# Patient Record
Sex: FEMALE | Race: WHITE | NOT HISPANIC OR LATINO | URBAN - METROPOLITAN AREA
[De-identification: names, ages, dates, MRNs, and addresses within clinical notes are randomized per-mention and may not be internally consistent; named-entity substitution may affect disease eponyms.]

---

## 2017-09-21 ENCOUNTER — CONVERSION ENCOUNTER (OUTPATIENT)
Dept: GENERAL RADIOLOGY | Facility: HOSPITAL | Age: 72
End: 2017-09-21

## 2018-01-09 ENCOUNTER — OFFICE VISIT CONVERTED (OUTPATIENT)
Dept: FAMILY MEDICINE CLINIC | Facility: CLINIC | Age: 73
End: 2018-01-09
Attending: NURSE PRACTITIONER

## 2018-02-20 ENCOUNTER — CONVERSION ENCOUNTER (OUTPATIENT)
Dept: FAMILY MEDICINE CLINIC | Facility: CLINIC | Age: 73
End: 2018-02-20

## 2018-02-20 ENCOUNTER — OFFICE VISIT CONVERTED (OUTPATIENT)
Dept: FAMILY MEDICINE CLINIC | Facility: CLINIC | Age: 73
End: 2018-02-20
Attending: NURSE PRACTITIONER

## 2018-03-19 ENCOUNTER — CONVERSION ENCOUNTER (OUTPATIENT)
Dept: FAMILY MEDICINE CLINIC | Facility: CLINIC | Age: 73
End: 2018-03-19

## 2018-03-19 ENCOUNTER — OFFICE VISIT CONVERTED (OUTPATIENT)
Dept: FAMILY MEDICINE CLINIC | Facility: CLINIC | Age: 73
End: 2018-03-19
Attending: NURSE PRACTITIONER

## 2018-03-28 ENCOUNTER — OFFICE VISIT CONVERTED (OUTPATIENT)
Dept: GASTROENTEROLOGY | Facility: CLINIC | Age: 73
End: 2018-03-28
Attending: NURSE PRACTITIONER

## 2018-05-09 ENCOUNTER — OFFICE VISIT CONVERTED (OUTPATIENT)
Dept: FAMILY MEDICINE CLINIC | Facility: CLINIC | Age: 73
End: 2018-05-09
Attending: NURSE PRACTITIONER

## 2018-07-31 ENCOUNTER — OFFICE VISIT CONVERTED (OUTPATIENT)
Dept: GASTROENTEROLOGY | Facility: CLINIC | Age: 73
End: 2018-07-31
Attending: NURSE PRACTITIONER

## 2018-09-24 ENCOUNTER — CONVERSION ENCOUNTER (OUTPATIENT)
Dept: GENERAL RADIOLOGY | Facility: HOSPITAL | Age: 73
End: 2018-09-24

## 2018-10-03 ENCOUNTER — OFFICE VISIT CONVERTED (OUTPATIENT)
Dept: FAMILY MEDICINE CLINIC | Facility: CLINIC | Age: 73
End: 2018-10-03
Attending: NURSE PRACTITIONER

## 2018-10-03 ENCOUNTER — CONVERSION ENCOUNTER (OUTPATIENT)
Dept: FAMILY MEDICINE CLINIC | Facility: CLINIC | Age: 73
End: 2018-10-03

## 2018-10-15 ENCOUNTER — CONVERSION ENCOUNTER (OUTPATIENT)
Dept: GASTROENTEROLOGY | Facility: CLINIC | Age: 73
End: 2018-10-15

## 2018-10-15 ENCOUNTER — OFFICE VISIT CONVERTED (OUTPATIENT)
Dept: GASTROENTEROLOGY | Facility: CLINIC | Age: 73
End: 2018-10-15
Attending: NURSE PRACTITIONER

## 2018-10-24 ENCOUNTER — CONVERSION ENCOUNTER (OUTPATIENT)
Dept: FAMILY MEDICINE CLINIC | Facility: CLINIC | Age: 73
End: 2018-10-24

## 2018-10-24 ENCOUNTER — CONVERSION ENCOUNTER (OUTPATIENT)
Dept: GENERAL RADIOLOGY | Facility: HOSPITAL | Age: 73
End: 2018-10-24

## 2018-10-24 ENCOUNTER — OFFICE VISIT CONVERTED (OUTPATIENT)
Dept: FAMILY MEDICINE CLINIC | Facility: CLINIC | Age: 73
End: 2018-10-24
Attending: NURSE PRACTITIONER

## 2018-11-14 ENCOUNTER — OFFICE VISIT CONVERTED (OUTPATIENT)
Dept: FAMILY MEDICINE CLINIC | Facility: CLINIC | Age: 73
End: 2018-11-14
Attending: NURSE PRACTITIONER

## 2018-11-14 ENCOUNTER — CONVERSION ENCOUNTER (OUTPATIENT)
Dept: FAMILY MEDICINE CLINIC | Facility: CLINIC | Age: 73
End: 2018-11-14

## 2019-01-29 ENCOUNTER — HOSPITAL ENCOUNTER (OUTPATIENT)
Dept: GENERAL RADIOLOGY | Facility: HOSPITAL | Age: 74
Discharge: HOME | End: 2019-01-29
Attending: INTERNAL MEDICINE

## 2019-02-18 ENCOUNTER — HOSPITAL ENCOUNTER (OUTPATIENT)
Dept: LAB | Facility: HOSPITAL | Age: 74
Discharge: HOME | End: 2019-02-18

## 2019-02-18 LAB
ALBUMIN SERPL-MCNC: 3.3 G/DL (ref 3.5–5)
ALBUMIN/GLOB SERPL: 1.1 {RATIO} (ref 1.4–2.6)
ALP SERPL-CCNC: 96 U/L (ref 43–160)
ALT SERPL-CCNC: 16 U/L (ref 10–40)
ANION GAP SERPL CALC-SCNC: 16 MMOL/L (ref 8–19)
APPEARANCE UR: CLEAR
AST SERPL-CCNC: 12 U/L (ref 15–50)
BILIRUB SERPL-MCNC: 0.4 MG/DL (ref 0.2–1.3)
BILIRUB UR QL: NEGATIVE
BUN SERPL-MCNC: 45 MG/DL (ref 5–25)
BUN/CREAT SERPL: 15 {RATIO} (ref 6–20)
CALCIUM SERPL-MCNC: 9.8 MG/DL (ref 8.7–10.4)
CHLORIDE SERPL-SCNC: 107 MMOL/L (ref 99–111)
COLOR UR: YELLOW
CONV BACTERIA: NEGATIVE
CONV CO2: 26 MMOL/L (ref 22–32)
CONV COLLECTION SOURCE (UA): ABNORMAL
CONV CREATININE URINE, RANDOM: 81.2 MG/DL (ref 10–300)
CONV MICROALBUM.,U,RANDOM: 372.7 MG/L (ref 0–20)
CONV PROTEIN TO CREATININE RATIO (RANDOM URINE): 0.82 {RATIO} (ref 0–0.1)
CONV TOTAL PROTEIN: 6.2 G/DL (ref 6.3–8.2)
CONV UROBILINOGEN IN URINE BY AUTOMATED TEST STRIP: 1 {EHRLICHU}/DL (ref 0.1–1)
CREAT UR-MCNC: 2.95 MG/DL (ref 0.5–0.9)
GFR SERPLBLD BASED ON 1.73 SQ M-ARVRAT: 15 ML/MIN/{1.73_M2}
GLOBULIN UR ELPH-MCNC: 2.9 G/DL (ref 2–3.5)
GLUCOSE SERPL-MCNC: 93 MG/DL (ref 65–99)
GLUCOSE UR QL: NEGATIVE MG/DL
HGB UR QL STRIP: NEGATIVE
KETONES UR QL STRIP: NEGATIVE MG/DL
LEUKOCYTE ESTERASE UR QL STRIP: ABNORMAL
MAGNESIUM SERPL-MCNC: 2.13 MG/DL (ref 1.6–2.3)
MICROALBUMIN/CREAT UR: 459 MG/G{CRE} (ref 0–35)
NITRITE UR QL STRIP: NEGATIVE
OSMOLALITY SERPL CALC.SUM OF ELEC: 311 MOSM/KG (ref 273–304)
PH UR STRIP.AUTO: 7 [PH] (ref 5–8)
POTASSIUM SERPL-SCNC: 3.8 MMOL/L (ref 3.5–5.3)
PROT UR QL: 100 MG/DL
PROT UR-MCNC: 66.4 MG/DL
RBC #/AREA URNS HPF: ABNORMAL /[HPF]
SODIUM SERPL-SCNC: 145 MMOL/L (ref 135–147)
SP GR UR: 1.02 (ref 1–1.03)
WBC #/AREA URNS HPF: ABNORMAL /[HPF]

## 2019-04-01 ENCOUNTER — HOSPITAL ENCOUNTER (OUTPATIENT)
Dept: LAB | Facility: HOSPITAL | Age: 74
Discharge: HOME | End: 2019-04-01
Attending: NURSE PRACTITIONER

## 2019-04-01 LAB
APPEARANCE UR: CLEAR
BILIRUB UR QL: NEGATIVE
COLOR UR: YELLOW
CONV BACTERIA: NEGATIVE
CONV COLLECTION SOURCE (UA): ABNORMAL
CONV UROBILINOGEN IN URINE BY AUTOMATED TEST STRIP: 1 {EHRLICHU}/DL (ref 0.1–1)
CONV YEAST, UA: PRESENT
GLUCOSE UR QL: NEGATIVE MG/DL
HGB UR QL STRIP: NEGATIVE
KETONES UR QL STRIP: NEGATIVE MG/DL
LEUKOCYTE ESTERASE UR QL STRIP: ABNORMAL
NITRITE UR QL STRIP: NEGATIVE
PH UR STRIP.AUTO: 5.5 [PH] (ref 5–8)
PROT UR QL: 30 MG/DL
RBC #/AREA URNS HPF: ABNORMAL /[HPF]
SP GR UR: 1.02 (ref 1–1.03)
SQUAMOUS SPT QL MICRO: ABNORMAL /[HPF]
WBC #/AREA URNS HPF: ABNORMAL /[HPF]

## 2019-05-10 ENCOUNTER — HOSPITAL ENCOUNTER (OUTPATIENT)
Dept: OTHER | Facility: HOSPITAL | Age: 74
Discharge: HOME | End: 2019-05-10
Attending: INTERNAL MEDICINE

## 2019-05-10 LAB
ANION GAP SERPL CALC-SCNC: 24 MMOL/L (ref 8–19)
BUN SERPL-MCNC: 59 MG/DL (ref 5–25)
BUN/CREAT SERPL: 15 {RATIO} (ref 6–20)
CALCIUM SERPL-MCNC: 9.6 MG/DL (ref 8.7–10.4)
CHLORIDE SERPL-SCNC: 99 MMOL/L (ref 99–111)
CONV CO2: 23 MMOL/L (ref 22–32)
CREAT UR-MCNC: 3.98 MG/DL (ref 0.5–0.9)
GFR SERPLBLD BASED ON 1.73 SQ M-ARVRAT: 10 ML/MIN/{1.73_M2}
GLUCOSE SERPL-MCNC: 137 MG/DL (ref 65–99)
OSMOLALITY SERPL CALC.SUM OF ELEC: 313 MOSM/KG (ref 273–304)
POTASSIUM SERPL-SCNC: 3.7 MMOL/L (ref 3.5–5.3)
SODIUM SERPL-SCNC: 142 MMOL/L (ref 135–147)

## 2019-05-17 ENCOUNTER — HOSPITAL ENCOUNTER (OUTPATIENT)
Dept: LAB | Facility: HOSPITAL | Age: 74
Discharge: HOME | End: 2019-05-17
Attending: INTERNAL MEDICINE

## 2019-05-17 LAB
ANION GAP SERPL CALC-SCNC: 19 MMOL/L (ref 8–19)
BUN SERPL-MCNC: 41 MG/DL (ref 5–25)
BUN/CREAT SERPL: 11 {RATIO} (ref 6–20)
CALCIUM SERPL-MCNC: 9.2 MG/DL (ref 8.7–10.4)
CHLORIDE SERPL-SCNC: 105 MMOL/L (ref 99–111)
CONV CO2: 23 MMOL/L (ref 22–32)
CREAT UR-MCNC: 3.7 MG/DL (ref 0.5–0.9)
GFR SERPLBLD BASED ON 1.73 SQ M-ARVRAT: 11 ML/MIN/{1.73_M2}
GLUCOSE SERPL-MCNC: 114 MG/DL (ref 65–99)
OSMOLALITY SERPL CALC.SUM OF ELEC: 307 MOSM/KG (ref 273–304)
POTASSIUM SERPL-SCNC: 3.9 MMOL/L (ref 3.5–5.3)
SODIUM SERPL-SCNC: 143 MMOL/L (ref 135–147)

## 2019-05-21 ENCOUNTER — OFFICE VISIT CONVERTED (OUTPATIENT)
Dept: FAMILY MEDICINE CLINIC | Facility: CLINIC | Age: 74
End: 2019-05-21
Attending: NURSE PRACTITIONER

## 2019-05-21 ENCOUNTER — CONVERSION ENCOUNTER (OUTPATIENT)
Dept: FAMILY MEDICINE CLINIC | Facility: CLINIC | Age: 74
End: 2019-05-21

## 2019-06-03 ENCOUNTER — HOSPITAL ENCOUNTER (OUTPATIENT)
Dept: LAB | Facility: HOSPITAL | Age: 74
Discharge: HOME | End: 2019-06-03
Attending: INTERNAL MEDICINE

## 2019-06-03 LAB
ANION GAP SERPL CALC-SCNC: 20 MMOL/L (ref 8–19)
BUN SERPL-MCNC: 51 MG/DL (ref 5–25)
BUN/CREAT SERPL: 15 {RATIO} (ref 6–20)
CALCIUM SERPL-MCNC: 9.1 MG/DL (ref 8.7–10.4)
CHLORIDE SERPL-SCNC: 104 MMOL/L (ref 99–111)
CONV CO2: 23 MMOL/L (ref 22–32)
CREAT UR-MCNC: 3.37 MG/DL (ref 0.5–0.9)
GFR SERPLBLD BASED ON 1.73 SQ M-ARVRAT: 13 ML/MIN/{1.73_M2}
GLUCOSE SERPL-MCNC: 114 MG/DL (ref 65–99)
OSMOLALITY SERPL CALC.SUM OF ELEC: 311 MOSM/KG (ref 273–304)
POTASSIUM SERPL-SCNC: 3.6 MMOL/L (ref 3.5–5.3)
SODIUM SERPL-SCNC: 143 MMOL/L (ref 135–147)

## 2019-06-18 ENCOUNTER — HOSPITAL ENCOUNTER (OUTPATIENT)
Dept: LAB | Facility: HOSPITAL | Age: 74
Discharge: HOME | End: 2019-06-18

## 2019-06-18 LAB
ANION GAP SERPL CALC-SCNC: 22 MMOL/L (ref 8–19)
BUN SERPL-MCNC: 53 MG/DL (ref 5–25)
BUN/CREAT SERPL: 14 {RATIO} (ref 6–20)
CALCIUM SERPL-MCNC: 9.4 MG/DL (ref 8.7–10.4)
CHLORIDE SERPL-SCNC: 106 MMOL/L (ref 99–111)
CONV CO2: 22 MMOL/L (ref 22–32)
CREAT UR-MCNC: 3.75 MG/DL (ref 0.5–0.9)
GFR SERPLBLD BASED ON 1.73 SQ M-ARVRAT: 11 ML/MIN/{1.73_M2}
GLUCOSE SERPL-MCNC: 119 MG/DL (ref 65–99)
MAGNESIUM SERPL-MCNC: 1.98 MG/DL (ref 1.6–2.3)
OSMOLALITY SERPL CALC.SUM OF ELEC: 318 MOSM/KG (ref 273–304)
POTASSIUM SERPL-SCNC: 4.4 MMOL/L (ref 3.5–5.3)
SODIUM SERPL-SCNC: 146 MMOL/L (ref 135–147)

## 2019-07-01 ENCOUNTER — HOSPITAL ENCOUNTER (OUTPATIENT)
Dept: GENERAL RADIOLOGY | Facility: HOSPITAL | Age: 74
Discharge: HOME | End: 2019-07-01
Attending: NURSE PRACTITIONER

## 2019-07-01 ENCOUNTER — OFFICE VISIT CONVERTED (OUTPATIENT)
Dept: FAMILY MEDICINE CLINIC | Facility: CLINIC | Age: 74
End: 2019-07-01
Attending: NURSE PRACTITIONER

## 2019-07-01 ENCOUNTER — CONVERSION ENCOUNTER (OUTPATIENT)
Dept: FAMILY MEDICINE CLINIC | Facility: CLINIC | Age: 74
End: 2019-07-01

## 2019-07-01 ENCOUNTER — HOSPITAL ENCOUNTER (OUTPATIENT)
Dept: LAB | Facility: HOSPITAL | Age: 74
Discharge: HOME | End: 2019-07-01
Attending: INTERNAL MEDICINE

## 2019-07-01 LAB
ANION GAP SERPL CALC-SCNC: 22 MMOL/L (ref 8–19)
BUN SERPL-MCNC: 36 MG/DL (ref 5–25)
BUN/CREAT SERPL: 8 {RATIO} (ref 6–20)
CALCIUM SERPL-MCNC: 9.5 MG/DL (ref 8.7–10.4)
CHLORIDE SERPL-SCNC: 103 MMOL/L (ref 99–111)
CONV CO2: 22 MMOL/L (ref 22–32)
CREAT UR-MCNC: 4.61 MG/DL (ref 0.5–0.9)
GFR SERPLBLD BASED ON 1.73 SQ M-ARVRAT: 9 ML/MIN/{1.73_M2}
GLUCOSE SERPL-MCNC: 108 MG/DL (ref 65–99)
OSMOLALITY SERPL CALC.SUM OF ELEC: 303 MOSM/KG (ref 273–304)
POTASSIUM SERPL-SCNC: 4.5 MMOL/L (ref 3.5–5.3)
SODIUM SERPL-SCNC: 142 MMOL/L (ref 135–147)

## 2019-07-03 ENCOUNTER — HOSPITAL ENCOUNTER (OUTPATIENT)
Dept: LAB | Facility: HOSPITAL | Age: 74
Discharge: HOME | End: 2019-07-03
Attending: NURSE PRACTITIONER

## 2019-07-03 ENCOUNTER — CONVERSION ENCOUNTER (OUTPATIENT)
Dept: GASTROENTEROLOGY | Facility: CLINIC | Age: 74
End: 2019-07-03

## 2019-07-03 ENCOUNTER — OFFICE VISIT CONVERTED (OUTPATIENT)
Dept: GASTROENTEROLOGY | Facility: CLINIC | Age: 74
End: 2019-07-03
Attending: NURSE PRACTITIONER

## 2019-07-03 LAB — BNP SERPL-MCNC: 7079 PG/ML (ref 0–1800)

## 2019-07-12 ENCOUNTER — HOSPITAL ENCOUNTER (OUTPATIENT)
Dept: LAB | Facility: HOSPITAL | Age: 74
Discharge: HOME | End: 2019-07-12
Attending: INTERNAL MEDICINE

## 2019-07-12 LAB
ALBUMIN SERPL-MCNC: 3.7 G/DL (ref 3.5–5)
ALBUMIN/GLOB SERPL: 1.2 {RATIO} (ref 1.4–2.6)
ALP SERPL-CCNC: 117 U/L (ref 43–160)
ALT SERPL-CCNC: 6 U/L (ref 10–40)
ANION GAP SERPL CALC-SCNC: 21 MMOL/L (ref 8–19)
AST SERPL-CCNC: 12 U/L (ref 15–50)
BASOPHILS # BLD AUTO: 0.1 10*3/UL (ref 0–0.2)
BASOPHILS NFR BLD AUTO: 1.2 % (ref 0–3)
BILIRUB SERPL-MCNC: 0.36 MG/DL (ref 0.2–1.3)
BUN SERPL-MCNC: 36 MG/DL (ref 5–25)
BUN/CREAT SERPL: 8 {RATIO} (ref 6–20)
CALCIUM SERPL-MCNC: 9.6 MG/DL (ref 8.7–10.4)
CHLORIDE SERPL-SCNC: 105 MMOL/L (ref 99–111)
CONV ABS IMM GRAN: 0.05 10*3/UL (ref 0–0.2)
CONV CO2: 21 MMOL/L (ref 22–32)
CONV IMMATURE GRAN: 0.6 % (ref 0–1.8)
CONV TOTAL PROTEIN: 6.7 G/DL (ref 6.3–8.2)
CREAT UR-MCNC: 4.65 MG/DL (ref 0.5–0.9)
DEPRECATED RDW RBC AUTO: 55.9 FL (ref 36.4–46.3)
EOSINOPHIL # BLD AUTO: 0.4 10*3/UL (ref 0–0.7)
EOSINOPHIL # BLD AUTO: 5 % (ref 0–7)
ERYTHROCYTE [DISTWIDTH] IN BLOOD BY AUTOMATED COUNT: 16.5 % (ref 11.7–14.4)
GFR SERPLBLD BASED ON 1.73 SQ M-ARVRAT: 9 ML/MIN/{1.73_M2}
GLOBULIN UR ELPH-MCNC: 3 G/DL (ref 2–3.5)
GLUCOSE SERPL-MCNC: 112 MG/DL (ref 65–99)
HBA1C MFR BLD: 10.2 G/DL (ref 12–16)
HCT VFR BLD AUTO: 33.6 % (ref 37–47)
LIPASE SERPL-CCNC: 28 U/L (ref 5–51)
LYMPHOCYTES # BLD AUTO: 1.68 10*3/UL (ref 1–5)
MCH RBC QN AUTO: 28 PG (ref 27–31)
MCHC RBC AUTO-ENTMCNC: 30.4 G/DL (ref 33–37)
MCV RBC AUTO: 92.3 FL (ref 81–99)
MONOCYTES # BLD AUTO: 0.92 10*3/UL (ref 0.2–1.2)
MONOCYTES NFR BLD AUTO: 11.4 % (ref 3–10)
NEUTROPHILS # BLD AUTO: 4.92 10*3/UL (ref 2–8)
NEUTROPHILS NFR BLD AUTO: 61 % (ref 30–85)
NRBC CBCN: 0 % (ref 0–0.7)
OSMOLALITY SERPL CALC.SUM OF ELEC: 305 MOSM/KG (ref 273–304)
PLATELET # BLD AUTO: 261 10*3/UL (ref 130–400)
PMV BLD AUTO: 9.8 FL (ref 9.4–12.3)
POTASSIUM SERPL-SCNC: 4.1 MMOL/L (ref 3.5–5.3)
RBC # BLD AUTO: 3.64 10*6/UL (ref 4.2–5.4)
SODIUM SERPL-SCNC: 143 MMOL/L (ref 135–147)
VARIANT LYMPHS NFR BLD MANUAL: 20.8 % (ref 20–45)
WBC # BLD AUTO: 8.07 10*3/UL (ref 4.8–10.8)

## 2019-07-16 ENCOUNTER — HOSPITAL ENCOUNTER (OUTPATIENT)
Dept: GENERAL RADIOLOGY | Facility: HOSPITAL | Age: 74
Discharge: HOME | End: 2019-07-16
Attending: INTERNAL MEDICINE

## 2019-07-18 ENCOUNTER — HOSPITAL ENCOUNTER (OUTPATIENT)
Dept: LAB | Facility: HOSPITAL | Age: 74
Discharge: HOME | End: 2019-07-18
Attending: INTERNAL MEDICINE

## 2019-07-18 LAB
ANION GAP SERPL CALC-SCNC: 21 MMOL/L (ref 8–19)
BUN SERPL-MCNC: 38 MG/DL (ref 5–25)
BUN/CREAT SERPL: 8 {RATIO} (ref 6–20)
CALCIUM SERPL-MCNC: 9.7 MG/DL (ref 8.7–10.4)
CHLORIDE SERPL-SCNC: 107 MMOL/L (ref 99–111)
CONV CO2: 21 MMOL/L (ref 22–32)
CREAT UR-MCNC: 4.49 MG/DL (ref 0.5–0.9)
GFR SERPLBLD BASED ON 1.73 SQ M-ARVRAT: 9 ML/MIN/{1.73_M2}
GLUCOSE SERPL-MCNC: 106 MG/DL (ref 65–99)
OSMOLALITY SERPL CALC.SUM OF ELEC: 307 MOSM/KG (ref 273–304)
POTASSIUM SERPL-SCNC: 4.6 MMOL/L (ref 3.5–5.3)
SODIUM SERPL-SCNC: 144 MMOL/L (ref 135–147)

## 2019-07-29 ENCOUNTER — HOSPITAL ENCOUNTER (OUTPATIENT)
Dept: LAB | Facility: HOSPITAL | Age: 74
Discharge: HOME | End: 2019-07-29
Attending: INTERNAL MEDICINE

## 2019-07-29 LAB
ALBUMIN SERPL-MCNC: 3.8 G/DL (ref 3.5–5)
ALBUMIN/GLOB SERPL: 1.3 {RATIO} (ref 1.4–2.6)
ALP SERPL-CCNC: 117 U/L (ref 43–160)
ALT SERPL-CCNC: 9 U/L (ref 10–40)
ANION GAP SERPL CALC-SCNC: 22 MMOL/L (ref 8–19)
AST SERPL-CCNC: 17 U/L (ref 15–50)
BILIRUB SERPL-MCNC: 0.43 MG/DL (ref 0.2–1.3)
BUN SERPL-MCNC: 29 MG/DL (ref 5–25)
BUN/CREAT SERPL: 7 {RATIO} (ref 6–20)
CALCIUM SERPL-MCNC: 10.2 MG/DL (ref 8.7–10.4)
CHLORIDE SERPL-SCNC: 101 MMOL/L (ref 99–111)
CONV CO2: 22 MMOL/L (ref 22–32)
CONV TOTAL PROTEIN: 6.8 G/DL (ref 6.3–8.2)
CREAT UR-MCNC: 4.27 MG/DL (ref 0.5–0.9)
GFR SERPLBLD BASED ON 1.73 SQ M-ARVRAT: 9 ML/MIN/{1.73_M2}
GLOBULIN UR ELPH-MCNC: 3 G/DL (ref 2–3.5)
GLUCOSE SERPL-MCNC: 93 MG/DL (ref 65–99)
OSMOLALITY SERPL CALC.SUM OF ELEC: 298 MOSM/KG (ref 273–304)
POTASSIUM SERPL-SCNC: 4 MMOL/L (ref 3.5–5.3)
SODIUM SERPL-SCNC: 141 MMOL/L (ref 135–147)

## 2019-07-31 ENCOUNTER — HOSPITAL ENCOUNTER (OUTPATIENT)
Dept: LAB | Facility: HOSPITAL | Age: 74
Discharge: HOME | End: 2019-07-31
Attending: INTERNAL MEDICINE

## 2019-07-31 LAB
ALT SERPL-CCNC: 8 U/L (ref 10–40)
ANION GAP SERPL CALC-SCNC: 24 MMOL/L (ref 8–19)
BUN SERPL-MCNC: 37 MG/DL (ref 5–25)
BUN/CREAT SERPL: 8 {RATIO} (ref 6–20)
CALCIUM SERPL-MCNC: 10.2 MG/DL (ref 8.7–10.4)
CHLORIDE SERPL-SCNC: 102 MMOL/L (ref 99–111)
CHOLEST SERPL-MCNC: 280 MG/DL (ref 107–200)
CHOLEST/HDLC SERPL: 7.4 {RATIO} (ref 3–6)
CONV CO2: 20 MMOL/L (ref 22–32)
CONV CREATININE URINE, RANDOM: 60 MG/DL (ref 10–300)
CONV MICROALBUM.,U,RANDOM: 181.6 MG/L (ref 0–20)
CONV PROTEIN TO CREATININE RATIO (RANDOM URINE): 0.61 {RATIO} (ref 0–0.1)
CREAT UR-MCNC: 4.8 MG/DL (ref 0.5–0.9)
CRP SERPL HS-MCNC: 1.26 MG/DL (ref 0–0.5)
GFR SERPLBLD BASED ON 1.73 SQ M-ARVRAT: 8 ML/MIN/{1.73_M2}
GLUCOSE SERPL-MCNC: 87 MG/DL (ref 65–99)
HDLC SERPL-MCNC: 38 MG/DL (ref 40–60)
LDLC SERPL CALC-MCNC: 173 MG/DL (ref 70–100)
MICROALBUMIN/CREAT UR: 302.7 MG/G{CRE} (ref 0–35)
OSMOLALITY SERPL CALC.SUM OF ELEC: 300 MOSM/KG (ref 273–304)
POTASSIUM SERPL-SCNC: 5 MMOL/L (ref 3.5–5.3)
PROT UR-MCNC: 36.3 MG/DL
SODIUM SERPL-SCNC: 141 MMOL/L (ref 135–147)
TRIGL SERPL-MCNC: 347 MG/DL (ref 40–150)
VLDLC SERPL-MCNC: 69 MG/DL (ref 5–37)

## 2019-08-20 ENCOUNTER — HOSPITAL ENCOUNTER (OUTPATIENT)
Dept: LAB | Facility: HOSPITAL | Age: 74
Discharge: HOME | End: 2019-08-20
Attending: NURSE PRACTITIONER

## 2019-08-20 LAB — BNP SERPL-MCNC: 7366 PG/ML (ref 0–1800)

## 2019-08-21 ENCOUNTER — OFFICE VISIT CONVERTED (OUTPATIENT)
Dept: FAMILY MEDICINE CLINIC | Facility: CLINIC | Age: 74
End: 2019-08-21
Attending: NURSE PRACTITIONER

## 2019-08-28 ENCOUNTER — OFFICE VISIT CONVERTED (OUTPATIENT)
Dept: PULMONOLOGY | Facility: CLINIC | Age: 74
End: 2019-08-28
Attending: PHYSICIAN ASSISTANT

## 2019-08-30 ENCOUNTER — HOSPITAL ENCOUNTER (OUTPATIENT)
Dept: CARDIAC REHAB | Facility: HOSPITAL | Age: 74
Setting detail: RECURRING SERIES
Discharge: HOME | End: 2019-11-22
Attending: FAMILY MEDICINE

## 2019-09-05 ENCOUNTER — HOSPITAL ENCOUNTER (OUTPATIENT)
Dept: LAB | Facility: HOSPITAL | Age: 74
Discharge: HOME | End: 2019-09-05
Attending: INTERNAL MEDICINE

## 2019-09-05 LAB
ANION GAP SERPL CALC-SCNC: 24 MMOL/L (ref 8–19)
BUN SERPL-MCNC: 53 MG/DL (ref 5–25)
BUN/CREAT SERPL: 11 {RATIO} (ref 6–20)
CALCIUM SERPL-MCNC: 10 MG/DL (ref 8.7–10.4)
CHLORIDE SERPL-SCNC: 102 MMOL/L (ref 99–111)
CONV CO2: 19 MMOL/L (ref 22–32)
CREAT UR-MCNC: 4.61 MG/DL (ref 0.5–0.9)
GFR SERPLBLD BASED ON 1.73 SQ M-ARVRAT: 9 ML/MIN/{1.73_M2}
GLUCOSE SERPL-MCNC: 130 MG/DL (ref 65–99)
OSMOLALITY SERPL CALC.SUM OF ELEC: 304 MOSM/KG (ref 273–304)
POTASSIUM SERPL-SCNC: 5.5 MMOL/L (ref 3.5–5.3)
SODIUM SERPL-SCNC: 139 MMOL/L (ref 135–147)

## 2019-10-01 ENCOUNTER — HOSPITAL ENCOUNTER (OUTPATIENT)
Dept: LAB | Facility: HOSPITAL | Age: 74
Discharge: HOME | End: 2019-10-01
Attending: INTERNAL MEDICINE

## 2019-10-01 LAB
ANION GAP SERPL CALC-SCNC: 23 MMOL/L (ref 8–19)
BUN SERPL-MCNC: 51 MG/DL (ref 5–25)
BUN/CREAT SERPL: 11 {RATIO} (ref 6–20)
CALCIUM SERPL-MCNC: 10.3 MG/DL (ref 8.7–10.4)
CHLORIDE SERPL-SCNC: 106 MMOL/L (ref 99–111)
CONV CO2: 19 MMOL/L (ref 22–32)
CREAT UR-MCNC: 4.8 MG/DL (ref 0.5–0.9)
GFR SERPLBLD BASED ON 1.73 SQ M-ARVRAT: 8 ML/MIN/{1.73_M2}
GLUCOSE SERPL-MCNC: 97 MG/DL (ref 65–99)
OSMOLALITY SERPL CALC.SUM OF ELEC: 308 MOSM/KG (ref 273–304)
POTASSIUM SERPL-SCNC: 5.5 MMOL/L (ref 3.5–5.3)
SODIUM SERPL-SCNC: 142 MMOL/L (ref 135–147)

## 2019-10-07 ENCOUNTER — HOSPITAL ENCOUNTER (OUTPATIENT)
Dept: LAB | Facility: HOSPITAL | Age: 74
Discharge: HOME | End: 2019-10-07
Attending: INTERNAL MEDICINE

## 2019-10-07 LAB
ALBUMIN SERPL-MCNC: 4.1 G/DL (ref 3.5–5)
ALBUMIN/GLOB SERPL: 1.2 {RATIO} (ref 1.4–2.6)
ALP SERPL-CCNC: 120 U/L (ref 43–160)
ALT SERPL-CCNC: 9 U/L (ref 10–40)
ANION GAP SERPL CALC-SCNC: 24 MMOL/L (ref 8–19)
AST SERPL-CCNC: 18 U/L (ref 15–50)
BILIRUB SERPL-MCNC: 0.38 MG/DL (ref 0.2–1.3)
BUN SERPL-MCNC: 57 MG/DL (ref 5–25)
BUN/CREAT SERPL: 11 {RATIO} (ref 6–20)
CALCIUM SERPL-MCNC: 10.3 MG/DL (ref 8.7–10.4)
CHLORIDE SERPL-SCNC: 101 MMOL/L (ref 99–111)
CONV CO2: 19 MMOL/L (ref 22–32)
CONV TOTAL PROTEIN: 7.4 G/DL (ref 6.3–8.2)
CREAT UR-MCNC: 5.12 MG/DL (ref 0.5–0.9)
GFR SERPLBLD BASED ON 1.73 SQ M-ARVRAT: 8 ML/MIN/{1.73_M2}
GLOBULIN UR ELPH-MCNC: 3.3 G/DL (ref 2–3.5)
GLUCOSE SERPL-MCNC: 112 MG/DL (ref 65–99)
OSMOLALITY SERPL CALC.SUM OF ELEC: 305 MOSM/KG (ref 273–304)
POTASSIUM SERPL-SCNC: 5 MMOL/L (ref 3.5–5.3)
SODIUM SERPL-SCNC: 139 MMOL/L (ref 135–147)

## 2019-10-25 ENCOUNTER — HOSPITAL ENCOUNTER (OUTPATIENT)
Dept: GENERAL RADIOLOGY | Facility: HOSPITAL | Age: 74
Discharge: HOME | End: 2019-10-25
Attending: NURSE PRACTITIONER

## 2019-11-05 ENCOUNTER — HOSPITAL ENCOUNTER (OUTPATIENT)
Dept: LAB | Facility: HOSPITAL | Age: 74
Discharge: HOME | End: 2019-11-05
Attending: INTERNAL MEDICINE

## 2019-11-05 LAB
ANION GAP SERPL CALC-SCNC: 21 MMOL/L (ref 8–19)
BUN SERPL-MCNC: 58 MG/DL (ref 5–25)
BUN/CREAT SERPL: 13 {RATIO} (ref 6–20)
CALCIUM SERPL-MCNC: 10.8 MG/DL (ref 8.7–10.4)
CHLORIDE SERPL-SCNC: 103 MMOL/L (ref 99–111)
CONV CO2: 21 MMOL/L (ref 22–32)
CREAT UR-MCNC: 4.51 MG/DL (ref 0.5–0.9)
GFR SERPLBLD BASED ON 1.73 SQ M-ARVRAT: 9 ML/MIN/{1.73_M2}
GLUCOSE SERPL-MCNC: 96 MG/DL (ref 65–99)
OSMOLALITY SERPL CALC.SUM OF ELEC: 306 MOSM/KG (ref 273–304)
POTASSIUM SERPL-SCNC: 4.5 MMOL/L (ref 3.5–5.3)
SODIUM SERPL-SCNC: 140 MMOL/L (ref 135–147)

## 2019-11-07 ENCOUNTER — HOSPITAL ENCOUNTER (OUTPATIENT)
Dept: SURGERY | Facility: HOSPITAL | Age: 74
Setting detail: HOSPITAL OUTPATIENT SURGERY
Discharge: HOME | End: 2019-11-07
Attending: OPHTHALMOLOGY

## 2019-11-15 ENCOUNTER — HOSPITAL ENCOUNTER (OUTPATIENT)
Dept: SURGERY | Facility: HOSPITAL | Age: 74
Setting detail: HOSPITAL OUTPATIENT SURGERY
Discharge: HOME | End: 2019-11-15
Attending: OPHTHALMOLOGY

## 2019-11-20 ENCOUNTER — OFFICE VISIT CONVERTED (OUTPATIENT)
Dept: FAMILY MEDICINE CLINIC | Facility: CLINIC | Age: 74
End: 2019-11-20
Attending: NURSE PRACTITIONER

## 2019-12-02 ENCOUNTER — HOSPITAL ENCOUNTER (OUTPATIENT)
Dept: LAB | Facility: HOSPITAL | Age: 74
Discharge: HOME | End: 2019-12-02
Attending: INTERNAL MEDICINE

## 2019-12-02 LAB
ANION GAP SERPL CALC-SCNC: 26 MMOL/L (ref 8–19)
BUN SERPL-MCNC: 61 MG/DL (ref 5–25)
BUN/CREAT SERPL: 14 {RATIO} (ref 6–20)
CALCIUM SERPL-MCNC: 10.4 MG/DL (ref 8.7–10.4)
CHLORIDE SERPL-SCNC: 104 MMOL/L (ref 99–111)
CONV CO2: 19 MMOL/L (ref 22–32)
CREAT UR-MCNC: 4.5 MG/DL (ref 0.5–0.9)
GFR SERPLBLD BASED ON 1.73 SQ M-ARVRAT: 9 ML/MIN/{1.73_M2}
GLUCOSE SERPL-MCNC: 92 MG/DL (ref 65–99)
OSMOLALITY SERPL CALC.SUM OF ELEC: 315 MOSM/KG (ref 273–304)
POTASSIUM SERPL-SCNC: 4.7 MMOL/L (ref 3.5–5.3)
SODIUM SERPL-SCNC: 144 MMOL/L (ref 135–147)

## 2019-12-17 ENCOUNTER — HOSPITAL ENCOUNTER (OUTPATIENT)
Dept: LAB | Facility: HOSPITAL | Age: 74
Discharge: HOME | End: 2019-12-17
Attending: INTERNAL MEDICINE

## 2019-12-17 LAB
ALBUMIN SERPL-MCNC: 4.4 G/DL (ref 3.5–5)
ALBUMIN/GLOB SERPL: 1.3 {RATIO} (ref 1.4–2.6)
ALP SERPL-CCNC: 120 U/L (ref 43–160)
ALT SERPL-CCNC: 8 U/L (ref 10–40)
ANION GAP SERPL CALC-SCNC: 25 MMOL/L (ref 8–19)
APPEARANCE UR: ABNORMAL
AST SERPL-CCNC: 17 U/L (ref 15–50)
BILIRUB SERPL-MCNC: 0.52 MG/DL (ref 0.2–1.3)
BILIRUB UR QL: NEGATIVE
BUN SERPL-MCNC: 63 MG/DL (ref 5–25)
BUN/CREAT SERPL: 12 {RATIO} (ref 6–20)
CALCIUM SERPL-MCNC: 10.6 MG/DL (ref 8.7–10.4)
CHLORIDE SERPL-SCNC: 98 MMOL/L (ref 99–111)
COLOR UR: YELLOW
CONV BACTERIA: ABNORMAL
CONV CO2: 21 MMOL/L (ref 22–32)
CONV COLLECTION SOURCE (UA): ABNORMAL
CONV CREATININE URINE, RANDOM: 145.8 MG/DL (ref 10–300)
CONV MICROALBUM.,U,RANDOM: 105.1 MG/L (ref 0–20)
CONV TOTAL PROTEIN: 7.7 G/DL (ref 6.3–8.2)
CONV UROBILINOGEN IN URINE BY AUTOMATED TEST STRIP: 1 {EHRLICHU}/DL (ref 0.1–1)
CREAT UR-MCNC: 5.39 MG/DL (ref 0.5–0.9)
GFR SERPLBLD BASED ON 1.73 SQ M-ARVRAT: 7 ML/MIN/{1.73_M2}
GLOBULIN UR ELPH-MCNC: 3.3 G/DL (ref 2–3.5)
GLUCOSE SERPL-MCNC: 103 MG/DL (ref 65–99)
GLUCOSE UR QL: NEGATIVE MG/DL
HGB UR QL STRIP: NEGATIVE
KETONES UR QL STRIP: NEGATIVE MG/DL
LEUKOCYTE ESTERASE UR QL STRIP: ABNORMAL
MICROALBUMIN/CREAT UR: 72.1 MG/G{CRE} (ref 0–35)
NITRITE UR QL STRIP: NEGATIVE
OSMOLALITY SERPL CALC.SUM OF ELEC: 306 MOSM/KG (ref 273–304)
PH UR STRIP.AUTO: 6 [PH] (ref 5–8)
POTASSIUM SERPL-SCNC: 4.6 MMOL/L (ref 3.5–5.3)
PROT UR QL: 30 MG/DL
PROT UR-MCNC: 32 MG/DL
RBC #/AREA URNS HPF: ABNORMAL /[HPF]
SODIUM SERPL-SCNC: 139 MMOL/L (ref 135–147)
SP GR UR: 1.02 (ref 1–1.03)
WBC #/AREA URNS HPF: ABNORMAL /[HPF]

## 2019-12-31 ENCOUNTER — HOSPITAL ENCOUNTER (OUTPATIENT)
Dept: LAB | Facility: HOSPITAL | Age: 74
Discharge: HOME | End: 2019-12-31
Attending: INTERNAL MEDICINE

## 2019-12-31 LAB
25(OH)D3 SERPL-MCNC: 68.4 NG/ML (ref 30–100)
ALBUMIN SERPL-MCNC: 4 G/DL (ref 3.5–5)
ALBUMIN/GLOB SERPL: 1.1 {RATIO} (ref 1.4–2.6)
ALP SERPL-CCNC: 123 U/L (ref 43–160)
ALT SERPL-CCNC: 7 U/L (ref 10–40)
ALT SERPL-CCNC: 8 U/L (ref 10–40)
ANION GAP SERPL CALC-SCNC: 24 MMOL/L (ref 8–19)
APPEARANCE UR: ABNORMAL
AST SERPL-CCNC: 15 U/L (ref 15–50)
BASOPHILS # BLD AUTO: 0.09 10*3/UL (ref 0–0.2)
BASOPHILS NFR BLD AUTO: 1.3 % (ref 0–3)
BILIRUB SERPL-MCNC: 0.32 MG/DL (ref 0.2–1.3)
BILIRUB UR QL: NEGATIVE
BUN SERPL-MCNC: 72 MG/DL (ref 5–25)
BUN/CREAT SERPL: 15 {RATIO} (ref 6–20)
CALCIUM SERPL-MCNC: 11.2 MG/DL (ref 8.7–10.4)
CHLORIDE SERPL-SCNC: 102 MMOL/L (ref 99–111)
CHOLEST SERPL-MCNC: 162 MG/DL (ref 107–200)
CHOLEST/HDLC SERPL: 4.3 {RATIO} (ref 3–6)
COLOR UR: YELLOW
CONV ABS IMM GRAN: 0.02 10*3/UL (ref 0–0.2)
CONV BACTERIA: ABNORMAL
CONV CO2: 21 MMOL/L (ref 22–32)
CONV COLLECTION SOURCE (UA): ABNORMAL
CONV CREATININE URINE, RANDOM: 74.5 MG/DL (ref 10–300)
CONV IMMATURE GRAN: 0.3 % (ref 0–1.8)
CONV MICROALBUM.,U,RANDOM: 73.3 MG/L (ref 0–20)
CONV PROTEIN TO CREATININE RATIO (RANDOM URINE): 0.28 {RATIO} (ref 0–0.1)
CONV TOTAL PROTEIN: 7.6 G/DL (ref 6.3–8.2)
CONV UROBILINOGEN IN URINE BY AUTOMATED TEST STRIP: 0.2 {EHRLICHU}/DL (ref 0.1–1)
CREAT UR-MCNC: 4.67 MG/DL (ref 0.5–0.9)
DEPRECATED RDW RBC AUTO: 46.6 FL (ref 36.4–46.3)
EOSINOPHIL # BLD AUTO: 0.35 10*3/UL (ref 0–0.7)
EOSINOPHIL # BLD AUTO: 5 % (ref 0–7)
ERYTHROCYTE [DISTWIDTH] IN BLOOD BY AUTOMATED COUNT: 14 % (ref 11.7–14.4)
GFR SERPLBLD BASED ON 1.73 SQ M-ARVRAT: 8 ML/MIN/{1.73_M2}
GLOBULIN UR ELPH-MCNC: 3.6 G/DL (ref 2–3.5)
GLUCOSE SERPL-MCNC: 97 MG/DL (ref 65–99)
GLUCOSE UR QL: NEGATIVE MG/DL
HCT VFR BLD AUTO: 32.4 % (ref 37–47)
HDLC SERPL-MCNC: 38 MG/DL (ref 40–60)
HGB BLD-MCNC: 10.3 G/DL (ref 12–16)
HGB UR QL STRIP: ABNORMAL
IRON SATN MFR SERPL: 19 % (ref 20–55)
IRON SERPL-MCNC: 52 UG/DL (ref 60–170)
KETONES UR QL STRIP: NEGATIVE MG/DL
LDLC SERPL CALC-MCNC: 85 MG/DL (ref 70–100)
LEUKOCYTE ESTERASE UR QL STRIP: ABNORMAL
LYMPHOCYTES # BLD AUTO: 1.57 10*3/UL (ref 1–5)
LYMPHOCYTES NFR BLD AUTO: 22.5 % (ref 20–45)
MAGNESIUM SERPL-MCNC: 2.18 MG/DL (ref 1.6–2.3)
MCH RBC QN AUTO: 29.2 PG (ref 27–31)
MCHC RBC AUTO-ENTMCNC: 31.8 G/DL (ref 33–37)
MCV RBC AUTO: 91.8 FL (ref 81–99)
MICROALBUMIN/CREAT UR: 98.4 MG/G{CRE} (ref 0–35)
MONOCYTES # BLD AUTO: 0.75 10*3/UL (ref 0.2–1.2)
MONOCYTES NFR BLD AUTO: 10.7 % (ref 3–10)
NEUTROPHILS # BLD AUTO: 4.21 10*3/UL (ref 2–8)
NEUTROPHILS NFR BLD AUTO: 60.2 % (ref 30–85)
NITRITE UR QL STRIP: NEGATIVE
NRBC CBCN: 0 % (ref 0–0.7)
OSMOLALITY SERPL CALC.SUM OF ELEC: 317 MOSM/KG (ref 273–304)
PH UR STRIP.AUTO: 6.5 [PH] (ref 5–8)
PHOSPHATE SERPL-MCNC: 4.9 MG/DL (ref 2.4–4.5)
PLATELET # BLD AUTO: 285 10*3/UL (ref 130–400)
PMV BLD AUTO: 10 FL (ref 9.4–12.3)
POTASSIUM SERPL-SCNC: 4.3 MMOL/L (ref 3.5–5.3)
PROT UR QL: ABNORMAL MG/DL
PROT UR-MCNC: 20.9 MG/DL
RBC # BLD AUTO: 3.53 10*6/UL (ref 4.2–5.4)
RBC #/AREA URNS HPF: ABNORMAL /[HPF]
SODIUM SERPL-SCNC: 143 MMOL/L (ref 135–147)
SP GR UR: 1.01 (ref 1–1.03)
TIBC SERPL-MCNC: 275 UG/DL (ref 245–450)
TRANSFERRIN SERPL-MCNC: 192 MG/DL (ref 250–380)
TRIGL SERPL-MCNC: 195 MG/DL (ref 40–150)
VLDLC SERPL-MCNC: 39 MG/DL (ref 5–37)
WBC # BLD AUTO: 6.99 10*3/UL (ref 4.8–10.8)
WBC #/AREA URNS HPF: ABNORMAL /[HPF]

## 2020-01-01 LAB — PTH-INTACT SERPL-MCNC: 107.1 PG/ML (ref 11.1–79.5)

## 2020-01-06 ENCOUNTER — OFFICE VISIT CONVERTED (OUTPATIENT)
Dept: GASTROENTEROLOGY | Facility: CLINIC | Age: 75
End: 2020-01-06
Attending: NURSE PRACTITIONER

## 2020-01-14 ENCOUNTER — HOSPITAL ENCOUNTER (OUTPATIENT)
Dept: GENERAL RADIOLOGY | Facility: HOSPITAL | Age: 75
Discharge: HOME | End: 2020-01-14
Attending: PHYSICIAN ASSISTANT

## 2020-01-21 ENCOUNTER — HOSPITAL ENCOUNTER (OUTPATIENT)
Dept: INFUSION THERAPY | Facility: HOSPITAL | Age: 75
Discharge: HOME | End: 2020-01-21
Attending: INTERNAL MEDICINE

## 2020-01-21 ENCOUNTER — OFFICE VISIT CONVERTED (OUTPATIENT)
Dept: PULMONOLOGY | Facility: CLINIC | Age: 75
End: 2020-01-21
Attending: NURSE PRACTITIONER

## 2020-02-03 ENCOUNTER — HOSPITAL ENCOUNTER (OUTPATIENT)
Dept: LAB | Facility: HOSPITAL | Age: 75
Discharge: HOME | End: 2020-02-03
Attending: INTERNAL MEDICINE

## 2020-02-03 LAB
ANION GAP SERPL CALC-SCNC: 24 MMOL/L (ref 8–19)
BUN SERPL-MCNC: 57 MG/DL (ref 5–25)
BUN/CREAT SERPL: 13 {RATIO} (ref 6–20)
CALCIUM SERPL-MCNC: 8.9 MG/DL (ref 8.7–10.4)
CHLORIDE SERPL-SCNC: 105 MMOL/L (ref 99–111)
CONV CO2: 17 MMOL/L (ref 22–32)
CREAT UR-MCNC: 4.31 MG/DL (ref 0.5–0.9)
GFR SERPLBLD BASED ON 1.73 SQ M-ARVRAT: 9 ML/MIN/{1.73_M2}
GLUCOSE SERPL-MCNC: 93 MG/DL (ref 65–99)
OSMOLALITY SERPL CALC.SUM OF ELEC: 308 MOSM/KG (ref 273–304)
POTASSIUM SERPL-SCNC: 4.8 MMOL/L (ref 3.5–5.3)
SODIUM SERPL-SCNC: 141 MMOL/L (ref 135–147)

## 2020-02-25 ENCOUNTER — HOSPITAL ENCOUNTER (OUTPATIENT)
Dept: LAB | Facility: HOSPITAL | Age: 75
Discharge: HOME | End: 2020-02-25

## 2020-02-25 LAB
25(OH)D3 SERPL-MCNC: 54.2 NG/ML (ref 30–100)
ANION GAP SERPL CALC-SCNC: 23 MMOL/L (ref 8–19)
BUN SERPL-MCNC: 52 MG/DL (ref 5–25)
BUN/CREAT SERPL: 12 {RATIO} (ref 6–20)
CALCIUM SERPL-MCNC: 8.7 MG/DL (ref 8.7–10.4)
CHLORIDE SERPL-SCNC: 101 MMOL/L (ref 99–111)
CHOLEST SERPL-MCNC: 165 MG/DL (ref 107–200)
CHOLEST/HDLC SERPL: 3.8 {RATIO} (ref 3–6)
CONV CO2: 19 MMOL/L (ref 22–32)
CONV CREATININE URINE, RANDOM: 80.4 MG/DL (ref 10–300)
CONV MICROALBUM.,U,RANDOM: 121.9 MG/L (ref 0–20)
CREAT UR-MCNC: 4.32 MG/DL (ref 0.5–0.9)
CRP SERPL-MCNC: 5 MG/L (ref 0–5)
GFR SERPLBLD BASED ON 1.73 SQ M-ARVRAT: 9 ML/MIN/{1.73_M2}
GLUCOSE SERPL-MCNC: 93 MG/DL (ref 65–99)
HDLC SERPL-MCNC: 43 MG/DL (ref 40–60)
LDLC SERPL CALC-MCNC: 82 MG/DL (ref 70–100)
MICROALBUMIN/CREAT UR: 151.6 MG/G{CRE} (ref 0–35)
OSMOLALITY SERPL CALC.SUM OF ELEC: 300 MOSM/KG (ref 273–304)
POTASSIUM SERPL-SCNC: 4.5 MMOL/L (ref 3.5–5.3)
PTH-INTACT SERPL-MCNC: 602 PG/ML (ref 11.1–79.5)
SODIUM SERPL-SCNC: 138 MMOL/L (ref 135–147)
TRIGL SERPL-MCNC: 202 MG/DL (ref 40–150)
VLDLC SERPL-MCNC: 40 MG/DL (ref 5–37)

## 2020-03-18 ENCOUNTER — OFFICE VISIT CONVERTED (OUTPATIENT)
Dept: FAMILY MEDICINE CLINIC | Facility: CLINIC | Age: 75
End: 2020-03-18
Attending: NURSE PRACTITIONER

## 2020-04-21 ENCOUNTER — HOSPITAL ENCOUNTER (OUTPATIENT)
Dept: GENERAL RADIOLOGY | Facility: HOSPITAL | Age: 75
Discharge: HOME | End: 2020-04-21
Attending: NURSE PRACTITIONER

## 2020-05-04 ENCOUNTER — HOSPITAL ENCOUNTER (OUTPATIENT)
Dept: LAB | Facility: HOSPITAL | Age: 75
Discharge: HOME | End: 2020-05-04
Attending: INTERNAL MEDICINE

## 2020-05-04 LAB
ALBUMIN SERPL-MCNC: 3.7 G/DL (ref 3.5–5)
ALBUMIN/GLOB SERPL: 1.2 {RATIO} (ref 1.4–2.6)
ALP SERPL-CCNC: 124 U/L (ref 43–160)
ALT SERPL-CCNC: 6 U/L (ref 10–40)
ANION GAP SERPL CALC-SCNC: 22 MMOL/L (ref 8–19)
APPEARANCE UR: CLEAR
AST SERPL-CCNC: 11 U/L (ref 15–50)
BASOPHILS # BLD AUTO: 0.09 10*3/UL (ref 0–0.2)
BASOPHILS NFR BLD AUTO: 1.5 % (ref 0–3)
BILIRUB SERPL-MCNC: 0.31 MG/DL (ref 0.2–1.3)
BILIRUB UR QL: NEGATIVE
BUN SERPL-MCNC: 46 MG/DL (ref 5–25)
BUN/CREAT SERPL: 13 {RATIO} (ref 6–20)
CALCIUM SERPL-MCNC: 9.4 MG/DL (ref 8.7–10.4)
CHLORIDE SERPL-SCNC: 103 MMOL/L (ref 99–111)
COLOR UR: YELLOW
CONV ABS IMM GRAN: 0.01 10*3/UL (ref 0–0.2)
CONV BACTERIA: ABNORMAL
CONV CO2: 17 MMOL/L (ref 22–32)
CONV COLLECTION SOURCE (UA): ABNORMAL
CONV CREATININE URINE, RANDOM: 80.7 MG/DL (ref 10–300)
CONV HYALINE CASTS IN URINE MICRO: ABNORMAL /[LPF]
CONV IMMATURE GRAN: 0.2 % (ref 0–1.8)
CONV MICROALBUM.,U,RANDOM: 70.3 MG/L (ref 0–20)
CONV PROTEIN TO CREATININE RATIO (RANDOM URINE): 0.25 {RATIO} (ref 0–0.1)
CONV TOTAL PROTEIN: 6.8 G/DL (ref 6.3–8.2)
CONV UROBILINOGEN IN URINE BY AUTOMATED TEST STRIP: 0.2 {EHRLICHU}/DL (ref 0.1–1)
CREAT UR-MCNC: 3.68 MG/DL (ref 0.5–0.9)
DEPRECATED RDW RBC AUTO: 47.2 FL (ref 36.4–46.3)
EOSINOPHIL # BLD AUTO: 0.29 10*3/UL (ref 0–0.7)
EOSINOPHIL # BLD AUTO: 5 % (ref 0–7)
ERYTHROCYTE [DISTWIDTH] IN BLOOD BY AUTOMATED COUNT: 14.1 % (ref 11.7–14.4)
GFR SERPLBLD BASED ON 1.73 SQ M-ARVRAT: 11 ML/MIN/{1.73_M2}
GLOBULIN UR ELPH-MCNC: 3.1 G/DL (ref 2–3.5)
GLUCOSE SERPL-MCNC: 171 MG/DL (ref 65–99)
GLUCOSE UR QL: NEGATIVE MG/DL
HCT VFR BLD AUTO: 35.8 % (ref 37–47)
HGB BLD-MCNC: 11.2 G/DL (ref 12–16)
HGB UR QL STRIP: NEGATIVE
KETONES UR QL STRIP: NEGATIVE MG/DL
LEUKOCYTE ESTERASE UR QL STRIP: ABNORMAL
LYMPHOCYTES # BLD AUTO: 1.23 10*3/UL (ref 1–5)
LYMPHOCYTES NFR BLD AUTO: 21.1 % (ref 20–45)
MCH RBC QN AUTO: 28.6 PG (ref 27–31)
MCHC RBC AUTO-ENTMCNC: 31.3 G/DL (ref 33–37)
MCV RBC AUTO: 91.6 FL (ref 81–99)
MICROALBUMIN/CREAT UR: 87.1 MG/G{CRE} (ref 0–35)
MONOCYTES # BLD AUTO: 0.46 10*3/UL (ref 0.2–1.2)
MONOCYTES NFR BLD AUTO: 7.9 % (ref 3–10)
NEUTROPHILS # BLD AUTO: 3.75 10*3/UL (ref 2–8)
NEUTROPHILS NFR BLD AUTO: 64.3 % (ref 30–85)
NITRITE UR QL STRIP: NEGATIVE
NRBC CBCN: 0 % (ref 0–0.7)
OSMOLALITY SERPL CALC.SUM OF ELEC: 302 MOSM/KG (ref 273–304)
PH UR STRIP.AUTO: 5.5 [PH] (ref 5–8)
PLATELET # BLD AUTO: 197 10*3/UL (ref 130–400)
PMV BLD AUTO: 10.4 FL (ref 9.4–12.3)
POTASSIUM SERPL-SCNC: 4.3 MMOL/L (ref 3.5–5.3)
PROT UR QL: ABNORMAL MG/DL
PROT UR-MCNC: 20.4 MG/DL
RBC # BLD AUTO: 3.91 10*6/UL (ref 4.2–5.4)
RBC #/AREA URNS HPF: ABNORMAL /[HPF]
SODIUM SERPL-SCNC: 138 MMOL/L (ref 135–147)
SP GR UR: 1.01 (ref 1–1.03)
WBC # BLD AUTO: 5.83 10*3/UL (ref 4.8–10.8)
WBC #/AREA URNS HPF: ABNORMAL /[HPF]

## 2020-05-12 ENCOUNTER — OFFICE VISIT CONVERTED (OUTPATIENT)
Dept: PULMONOLOGY | Facility: CLINIC | Age: 75
End: 2020-05-12
Attending: INTERNAL MEDICINE

## 2020-05-19 ENCOUNTER — HOSPITAL ENCOUNTER (OUTPATIENT)
Dept: PET IMAGING | Facility: HOSPITAL | Age: 75
Discharge: HOME | End: 2020-05-19
Attending: INTERNAL MEDICINE

## 2020-06-02 ENCOUNTER — HOSPITAL ENCOUNTER (OUTPATIENT)
Dept: LAB | Facility: HOSPITAL | Age: 75
Discharge: HOME | End: 2020-06-02
Attending: INTERNAL MEDICINE

## 2020-06-02 ENCOUNTER — HOSPITAL ENCOUNTER (OUTPATIENT)
Dept: GENERAL RADIOLOGY | Facility: HOSPITAL | Age: 75
Discharge: HOME | End: 2020-06-02
Attending: NURSE PRACTITIONER

## 2020-06-02 LAB
ANION GAP SERPL CALC-SCNC: 25 MMOL/L (ref 8–19)
BUN SERPL-MCNC: 55 MG/DL (ref 5–25)
BUN/CREAT SERPL: 14 {RATIO} (ref 6–20)
CALCIUM SERPL-MCNC: 9.9 MG/DL (ref 8.7–10.4)
CHLORIDE SERPL-SCNC: 109 MMOL/L (ref 99–111)
CONV CO2: 15 MMOL/L (ref 22–32)
CREAT UR-MCNC: 3.85 MG/DL (ref 0.5–0.9)
GFR SERPLBLD BASED ON 1.73 SQ M-ARVRAT: 11 ML/MIN/{1.73_M2}
GLUCOSE SERPL-MCNC: 109 MG/DL (ref 65–99)
OSMOLALITY SERPL CALC.SUM OF ELEC: 314 MOSM/KG (ref 273–304)
POTASSIUM SERPL-SCNC: 4.7 MMOL/L (ref 3.5–5.3)
SODIUM SERPL-SCNC: 144 MMOL/L (ref 135–147)

## 2020-06-15 ENCOUNTER — OFFICE VISIT CONVERTED (OUTPATIENT)
Dept: PULMONOLOGY | Facility: CLINIC | Age: 75
End: 2020-06-15
Attending: PHYSICIAN ASSISTANT

## 2020-07-08 ENCOUNTER — HOSPITAL ENCOUNTER (OUTPATIENT)
Dept: LAB | Facility: HOSPITAL | Age: 75
Discharge: HOME | End: 2020-07-08
Attending: INTERNAL MEDICINE

## 2020-07-08 LAB
ANION GAP SERPL CALC-SCNC: 21 MMOL/L (ref 8–19)
BUN SERPL-MCNC: 57 MG/DL (ref 5–25)
BUN/CREAT SERPL: 14 {RATIO} (ref 6–20)
CALCIUM SERPL-MCNC: 9.6 MG/DL (ref 8.7–10.4)
CHLORIDE SERPL-SCNC: 106 MMOL/L (ref 99–111)
CONV CO2: 18 MMOL/L (ref 22–32)
CREAT UR-MCNC: 3.99 MG/DL (ref 0.5–0.9)
GFR SERPLBLD BASED ON 1.73 SQ M-ARVRAT: 10 ML/MIN/{1.73_M2}
GLUCOSE SERPL-MCNC: 105 MG/DL (ref 65–99)
OSMOLALITY SERPL CALC.SUM OF ELEC: 306 MOSM/KG (ref 273–304)
POTASSIUM SERPL-SCNC: 4.7 MMOL/L (ref 3.5–5.3)
SODIUM SERPL-SCNC: 140 MMOL/L (ref 135–147)

## 2020-07-24 ENCOUNTER — HOSPITAL ENCOUNTER (OUTPATIENT)
Dept: INFUSION THERAPY | Facility: HOSPITAL | Age: 75
Discharge: HOME | End: 2020-07-24
Attending: INTERNAL MEDICINE

## 2020-07-24 LAB
ALBUMIN SERPL-MCNC: 3.8 G/DL (ref 3.5–5)
ALBUMIN/GLOB SERPL: 1.4 {RATIO} (ref 1.4–2.6)
ALP SERPL-CCNC: 114 U/L (ref 43–160)
ALT SERPL-CCNC: 6 U/L (ref 10–40)
ANION GAP SERPL CALC-SCNC: 22 MMOL/L (ref 8–19)
APPEARANCE UR: ABNORMAL
AST SERPL-CCNC: 10 U/L (ref 15–50)
BASOPHILS # BLD AUTO: 0.06 10*3/UL (ref 0–0.2)
BASOPHILS NFR BLD AUTO: 1.2 % (ref 0–3)
BILIRUB SERPL-MCNC: 0.4 MG/DL (ref 0.2–1.3)
BILIRUB UR QL: NEGATIVE
BUN SERPL-MCNC: 67 MG/DL (ref 5–25)
BUN/CREAT SERPL: 14 {RATIO} (ref 6–20)
CALCIUM SERPL-MCNC: 9.9 MG/DL (ref 8.7–10.4)
CHLORIDE SERPL-SCNC: 101 MMOL/L (ref 99–111)
COLOR UR: YELLOW
CONV ABS IMM GRAN: 0.02 10*3/UL (ref 0–0.2)
CONV BACTERIA: ABNORMAL
CONV CO2: 18 MMOL/L (ref 22–32)
CONV COLLECTION SOURCE (UA): ABNORMAL
CONV CREATININE URINE, RANDOM: 138.8 MG/DL (ref 10–300)
CONV HYALINE CASTS IN URINE MICRO: ABNORMAL /[LPF]
CONV IMMATURE GRAN: 0.4 % (ref 0–1.8)
CONV MICROALBUM.,U,RANDOM: 119.8 MG/L (ref 0–20)
CONV PROTEIN TO CREATININE RATIO (RANDOM URINE): 0.31 {RATIO} (ref 0–0.1)
CONV TOTAL PROTEIN: 6.6 G/DL (ref 6.3–8.2)
CONV UROBILINOGEN IN URINE BY AUTOMATED TEST STRIP: 1 {EHRLICHU}/DL (ref 0.1–1)
CONV YEAST, UA: PRESENT
CREAT UR-MCNC: 4.81 MG/DL (ref 0.5–0.9)
DEPRECATED RDW RBC AUTO: 47.1 FL (ref 36.4–46.3)
EOSINOPHIL # BLD AUTO: 0.32 10*3/UL (ref 0–0.7)
EOSINOPHIL # BLD AUTO: 6.4 % (ref 0–7)
ERYTHROCYTE [DISTWIDTH] IN BLOOD BY AUTOMATED COUNT: 13.9 % (ref 11.7–14.4)
GFR SERPLBLD BASED ON 1.73 SQ M-ARVRAT: 8 ML/MIN/{1.73_M2}
GLOBULIN UR ELPH-MCNC: 2.8 G/DL (ref 2–3.5)
GLUCOSE SERPL-MCNC: 91 MG/DL (ref 65–99)
GLUCOSE UR QL: NEGATIVE MG/DL
HCT VFR BLD AUTO: 33.3 % (ref 37–47)
HGB BLD-MCNC: 10.6 G/DL (ref 12–16)
HGB UR QL STRIP: ABNORMAL
KETONES UR QL STRIP: NEGATIVE MG/DL
LEUKOCYTE ESTERASE UR QL STRIP: ABNORMAL
LYMPHOCYTES # BLD AUTO: 1.2 10*3/UL (ref 1–5)
LYMPHOCYTES NFR BLD AUTO: 24 % (ref 20–45)
MCH RBC QN AUTO: 29.2 PG (ref 27–31)
MCHC RBC AUTO-ENTMCNC: 31.8 G/DL (ref 33–37)
MCV RBC AUTO: 91.7 FL (ref 81–99)
MICROALBUMIN/CREAT UR: 86.3 MG/G{CRE} (ref 0–35)
MONOCYTES # BLD AUTO: 0.55 10*3/UL (ref 0.2–1.2)
MONOCYTES NFR BLD AUTO: 11 % (ref 3–10)
NEUTROPHILS # BLD AUTO: 2.85 10*3/UL (ref 2–8)
NEUTROPHILS NFR BLD AUTO: 57 % (ref 30–85)
NITRITE UR QL STRIP: NEGATIVE
NRBC CBCN: 0 % (ref 0–0.7)
OSMOLALITY SERPL CALC.SUM OF ELEC: 303 MOSM/KG (ref 273–304)
PH UR STRIP.AUTO: 5.5 [PH] (ref 5–8)
PHOSPHATE SERPL-MCNC: 4.4 MG/DL (ref 2.4–4.5)
PLATELET # BLD AUTO: 164 10*3/UL (ref 130–400)
PMV BLD AUTO: 10.2 FL (ref 9.4–12.3)
POTASSIUM SERPL-SCNC: 3.9 MMOL/L (ref 3.5–5.3)
PROT UR QL: 30 MG/DL
PROT UR-MCNC: 42.8 MG/DL
PTH-INTACT SERPL-MCNC: 454.9 PG/ML (ref 11.1–79.5)
RBC # BLD AUTO: 3.63 10*6/UL (ref 4.2–5.4)
RBC #/AREA URNS HPF: ABNORMAL /[HPF]
SODIUM SERPL-SCNC: 137 MMOL/L (ref 135–147)
SP GR UR: 1.01 (ref 1–1.03)
SQUAMOUS SPT QL MICRO: ABNORMAL /[HPF]
WBC # BLD AUTO: 5 10*3/UL (ref 4.8–10.8)
WBC #/AREA URNS HPF: ABNORMAL /[HPF]

## 2020-07-25 LAB — 25(OH)D3 SERPL-MCNC: 70 NG/ML (ref 30–100)

## 2020-08-07 ENCOUNTER — OFFICE VISIT CONVERTED (OUTPATIENT)
Dept: FAMILY MEDICINE CLINIC | Facility: CLINIC | Age: 75
End: 2020-08-07
Attending: NURSE PRACTITIONER

## 2020-08-18 ENCOUNTER — HOSPITAL ENCOUNTER (OUTPATIENT)
Dept: LAB | Facility: HOSPITAL | Age: 75
Discharge: HOME | End: 2020-08-18
Attending: NURSE PRACTITIONER

## 2020-08-18 LAB
ALBUMIN SERPL-MCNC: 3.6 G/DL (ref 3.5–5)
ALBUMIN/GLOB SERPL: 1.3 {RATIO} (ref 1.4–2.6)
ALP SERPL-CCNC: 109 U/L (ref 43–160)
ALT SERPL-CCNC: 6 U/L (ref 10–40)
ANION GAP SERPL CALC-SCNC: 20 MMOL/L (ref 8–19)
AST SERPL-CCNC: 11 U/L (ref 15–50)
BILIRUB SERPL-MCNC: 0.29 MG/DL (ref 0.2–1.3)
BUN SERPL-MCNC: 45 MG/DL (ref 5–25)
BUN/CREAT SERPL: 12 {RATIO} (ref 6–20)
CALCIUM SERPL-MCNC: 9.4 MG/DL (ref 8.7–10.4)
CHLORIDE SERPL-SCNC: 107 MMOL/L (ref 99–111)
CONV CO2: 19 MMOL/L (ref 22–32)
CONV TOTAL PROTEIN: 6.3 G/DL (ref 6.3–8.2)
CREAT UR-MCNC: 3.75 MG/DL (ref 0.5–0.9)
GFR SERPLBLD BASED ON 1.73 SQ M-ARVRAT: 11 ML/MIN/{1.73_M2}
GLOBULIN UR ELPH-MCNC: 2.7 G/DL (ref 2–3.5)
GLUCOSE SERPL-MCNC: 92 MG/DL (ref 65–99)
OSMOLALITY SERPL CALC.SUM OF ELEC: 303 MOSM/KG (ref 273–304)
POTASSIUM SERPL-SCNC: 4.5 MMOL/L (ref 3.5–5.3)
SODIUM SERPL-SCNC: 141 MMOL/L (ref 135–147)

## 2021-05-13 NOTE — PROGRESS NOTES
Progress Note      Patient Name: Michelle Weiss   Patient ID: [id number]   Sex: Female   YOB: 1945    Primary Care Provider: Johnathon BANDA   Referring Provider: Johnathon BANDA    Visit Date: August 7, 2020    Provider: VERONIKA Toure   Location: [facility]   Location Address: [address] [place] [address], [address] [place] [address]  [id number]   Location Phone: [phone]          Chief Complaint  · Stitch removal     Patient is here to have her stitches removed, states she feel down the steps at [facility]. She has had the stitches for 14 days.    Daughter wants to discuss certain events that have gone on.       History Of Present Illness  Michelle Weiss is a 75 year old /White female who presents for evaluation and treatment of:      Stitches on 7/25/2020. from a fall when legs got twisted.  Didn't lose consciousness.    Is at the point of needing dialysis.  Is going to talk with family to see if has here or in [place].    INsomnia:  trazodone helping sleep.       Past Medical History  Disease Name Date Onset Notes   Allergies --  --    Bladder disease --  --    Broken Bones --  --    Congestive Heart Failure --  --    Deafness --  --    Depression --  --    Essential hypertension 05/21/2019 --    Forgetfulness --  --    GERD (gastroesophageal reflux disease) 05/21/2019 --    Hemorrhoids --  --    High cholesterol --  --    Hypertension --  --    Insomnia, unspecified 05/21/2019 --    Kidney Disease --  --    Lumbago 05/21/2019 --    Reflux Disease --  --    Screening Mammogram 10/2018 --    Shortness of Breath --  --    Sinus trouble --  --    Stroke --  --          Past Surgical History  Procedure Name Date Notes   Appendectomy --  --    Cataract surgery of both eyes 11/7/19, 11/15/19 --    Colonoscopy 2018 --    Exploratory 1967 --    EYE SURGERY 1961 --    Hysterectomy 1993 --          Medication List  Name Date Started Instructions   acetaminophen 325 mg oral capsule   take 1 capsule by oral route daily as needed   amlodipine 5 mg oral tablet  take 0.5 tablet by oral route daily   aspirin 81 mg oral tablet,delayed release (DR/EC)  take 1 tablet (81 mg) by oral route once daily for 30 days   bumetanide 1 mg oral tablet 07/05/2019 take 1 tablet by oral route 3 times a day for 90 days   carvedilol 12.5 mg oral tablet 07/01/2019 TAKE 1 TABLET BY ORAL ROUTE 2 TIMES A DAY FOR 90 DAYS   donepezil 5 mg oral tablet 06/01/2020 TAKE 1 TABLET(5 MG) BY MOUTH EVERY DAY IN THE EVENING   Lyrica 50 mg oral capsule 08/07/2020 take 1 capsule by oral route daily for 90 days   melatonin 5 mg oral capsule  take 1 capsule by oral route once a day (at bedtime)   multivitamin oral capsule  take 1 capsule by oral route daily   nitroglycerin 0.3 mg sublingual tablet, sublingual  place 1 tablet (0.3 mg) by buccal route at the first sign of an attack; no more than 3 tabs are recommended within a 15 minute period.   Protonix 40 mg oral tablet,delayed release (DR/EC) 07/21/2020 take 1 tablet (40 mg) by oral route once daily for 90 days   Proventil HFA 90 mcg/actuation inhalation HFA aerosol inhaler 12/13/2017 inhale 1 - 2 puffs (90 - 180 mcg) by inhalation route every 4-6 hours as needed for 30 days   Prozac 20 mg oral capsule 07/21/2020 take 1 capsule (20 mg) by oral route once daily for 90 days   spironolactone 25 mg oral tablet  take 1 tablet (25 mg) by oral route once daily   trazodone 50 mg oral tablet 08/07/2020 TAKE 1 TABLET BY MOUTH EVERY NIGHT AT BEDTIME   Zyrtec 10 mg oral tablet  take 1 tablet (10 mg) by oral route once daily         Allergy List  Allergen Name Date Reaction Notes   amiodarone --  --  --    Bactrim DS --  --  --    Codeine Sulfate --  --  --    metronidazole --  --  --    niacin --  --  --          Family Medical History  Disease Name Relative/Age Notes   Breast Neoplasm, Malignant Sister/   --    Liver Neoplasm, Malignant Brother/57   --    Heart Disease  Brother/  Mother/  Sister/  Son/   brother and son had open heart surgery.   Ovarian Cancer, Family History Mother/   --    Diabetes Mother/   --          Social History  Finding Status Start/Stop Quantity Notes   Alcohol Light --/-- --  --    Tobacco Never --/-- --  --          Immunizations  NameDate Admin Mfg Trade Name Lot Number Route Inj VIS Given VIS Publication   Ryzciltuu00/20/2019 PMC Fluarix, quadrivalent, preservative free [id number] [facility] 11/20/2019    Comments: pt tolerated injection well   Gcmgwuvwz93/20/2019 PMC Fluarix, quadrivalent, preservative free [id number] [facility] 11/20/2019    Comments: pt tolerated injection well   Gpwlogszz97/20/2019 PMC Fluarix, quadrivalent, preservative free [id number] [facility] 11/20/2019    Comments: pt tolerated injection well   InfluenzaRefused 08/21/2019 NE Not Entered  NE NE     Comments:    Wregjdyxd83/03/2018 PMC Fluarix, quadrivalent, preservative free [id number] [facility] 10/03/2018 08/07/2015   Comments: pt tolerated injection well   Rehuvyegd02/07/2017 PMC Fluzone > 3 Years [id number]  11/07/2017 08/07/2015   Comments: Pt tolerated injection well   Prevnar 1305/21/2019 [facility] PREVNAR 13 [id number] [facility] 05/21/2019    Comments: pt tolerated injection well         Review of Systems  · Constitutional  o Denies  o : fever, weight gain, weight loss, malaise/fatigue  · Eyes  o Denies  o : diplopia, recent changes, blurred vision, redness of eye, eye pain, discharge from eye  · HENT  o Denies  o : sore throat, hearing changes, tinnitus, nose bleeding, sinus pain, nasal discharge, ear pain  · Breasts  o Denies  o : lumps, tenderness, swelling, nipple discharge  · Cardiovascular  o Denies  o : palpitation, chest pain, claudication, pedal edema  · Respiratory  o Denies  o : shortness of breath, ONEAL, PND/Orthopnea, hemoptysis, dry cough, productive cough  · Gastrointestinal  o Denies  o : nausea, vomiting, reflux, diarrhea, constipation, abdominal pain, blood in  "stools  · Genitourinary  o Denies  o : frequency, urgency, dysuria, vaginal discharge, penile discharge, incontinence, nocturia, irregular menses, hot flashes  · Neurologic  o Denies  o : unsteady gait, weakness, dizziness, H/A  · Musculoskeletal  o Denies  o : myalgias, joint pain, joint swelling  · Endocrine  o Denies  o : heat intolerance, cold intolerance, polyuria, polydipsia  · Psychiatric  o Denies  o : suicidal ideation, homicidal ideation, mood changes, hallucinations, memory  · Heme-Lymph  o Denies  o : easy bleeding, easy bruising, edema, lymph node enlargement or tenderness  · Allergic-Immunologic  o Denies  o : bees, frequent illnesses, seasonal allergies      Vitals  Date Time BP Position Site L\R Cuff Size HR RR TEMP (F) WT  HT  BMI kg/m2 BSA m2 O2 Sat        08/07/2020 10:51 /58 Sitting    75 - R   102lbs 8oz 4'  11\" 20.7 1.39           Physical Examination  · Constitutional  o Appearance  o : well developed, well-nourished, no acute distress  · Head and Face  o Head  o : normocephalic, atraumatic  · Ears, Nose, Mouth and Throat  o Ears  o :   § External Ears  § : external auditory canal appearance normal, no discharge present  § Otoscopic Examination  § : tympanic membranes pearly white/gray bilaterally  o Nose  o :   § External Nose  § : no lesions noted  § Nasopharynx  § : no discharge present  o Oral Cavity  o :   § Oral Mucosa  § : oral mucosa light pink  o Throat  o :   § Oropharynx  § : tonsils without exudate, no palatal petechiae  · Neck  o Inspection/Palpation  o : normal appearance, no masses or tenderness, trachea midline  o Thyroid  o : gland size normal, nontender, no nodules or masses present on palpation  · Respiratory  o Respiratory Effort  o : breathing unlabored  o Inspection of Chest  o : chest rise symmetric bilaterally  o Auscultation of Lungs  o : clear to auscultation bilaterally throughout inspiration and expiration  · Cardiovascular  o Heart  o :   § Auscultation of " Heart  § : regular rate and rhythm, no murmurs, gallops or rubs  o Peripheral Vascular System  o :   § Extremities  § : no edema  · Lymphatic  o Neck  o : no cervical lymphadenopathy, no supraclavicular lymphadenopathy  · Psychiatric  o Mood and Affect  o : mood normal, affect appropriate              Assessment  · CKD (chronic kidney disease) stage 4, GFR 15-29 ml/min     585.4/N18.4  · Laceration of chin without complication     873.44/S01.81XA    Problems Reconciled  Plan  · Orders  o BESSIE Report (KASPR) - - 08/07/2020  o ACO-39: Current medications updated and reviewed () - - 08/07/2020  o Suture removal (06674, 11460) - - 08/07/2020  · Medications  o Lyrica 50 mg oral capsule   SIG: take 1 capsule by oral route daily for 90 days   DISP: (90) Capsule with 0 refills  Refilled on 08/07/2020     o trazodone 50 mg oral tablet   SIG: TAKE 1 TABLET BY MOUTH EVERY NIGHT AT BEDTIME   DISP: (90) Tablet with 1 refills  Refilled on 08/07/2020     o clopidogrel 75 mg oral tablet   SIG: TAKE 1 TABLET BY MOUTH EVERY DAY   DISP: (90) Tablet with 1 refills  Discontinued on 08/07/2020     o Plavix 75 mg oral tablet   SIG: TAKE 1 TABLET (75 MG) BY ORAL ROUTE ONCE DAILY FOR 90 DAYS   DISP: (90) Tablet with 5 refills  Discontinued on 08/07/2020     o Medications have been Reconciled  o Transition of Care or Provider Policy  · Instructions  o Patient was educated/instructed on their diagnosis, treatment and medications prior to discharge from the clinic today.            Electronically Signed by: VERONIKA Toure - on August 7, 2020 11:31:52 AM

## 2021-05-15 VITALS
BODY MASS INDEX: 22.78 KG/M2 | RESPIRATION RATE: 16 BRPM | OXYGEN SATURATION: 98 % | DIASTOLIC BLOOD PRESSURE: 52 MMHG | WEIGHT: 113 LBS | HEART RATE: 75 BPM | HEIGHT: 59 IN | TEMPERATURE: 96.9 F | SYSTOLIC BLOOD PRESSURE: 100 MMHG

## 2021-05-15 VITALS
DIASTOLIC BLOOD PRESSURE: 65 MMHG | SYSTOLIC BLOOD PRESSURE: 112 MMHG | WEIGHT: 125 LBS | TEMPERATURE: 97.5 F | HEART RATE: 64 BPM | OXYGEN SATURATION: 99 % | RESPIRATION RATE: 16 BRPM | BODY MASS INDEX: 25.2 KG/M2 | HEIGHT: 59 IN

## 2021-05-15 VITALS
SYSTOLIC BLOOD PRESSURE: 131 MMHG | DIASTOLIC BLOOD PRESSURE: 50 MMHG | HEIGHT: 59 IN | BODY MASS INDEX: 28.12 KG/M2 | WEIGHT: 139.5 LBS

## 2021-05-15 VITALS
TEMPERATURE: 97.6 F | BODY MASS INDEX: 27.42 KG/M2 | HEART RATE: 61 BPM | DIASTOLIC BLOOD PRESSURE: 48 MMHG | OXYGEN SATURATION: 93 % | WEIGHT: 136 LBS | RESPIRATION RATE: 16 BRPM | SYSTOLIC BLOOD PRESSURE: 117 MMHG | HEIGHT: 59 IN

## 2021-05-15 VITALS
RESPIRATION RATE: 16 BRPM | DIASTOLIC BLOOD PRESSURE: 54 MMHG | HEART RATE: 69 BPM | TEMPERATURE: 97.6 F | WEIGHT: 144 LBS | BODY MASS INDEX: 29.03 KG/M2 | SYSTOLIC BLOOD PRESSURE: 144 MMHG | OXYGEN SATURATION: 93 % | HEIGHT: 59 IN

## 2021-05-15 VITALS
DIASTOLIC BLOOD PRESSURE: 57 MMHG | SYSTOLIC BLOOD PRESSURE: 127 MMHG | BODY MASS INDEX: 23.19 KG/M2 | WEIGHT: 118.12 LBS | HEART RATE: 60 BPM | HEIGHT: 60 IN

## 2021-05-15 VITALS
WEIGHT: 135 LBS | TEMPERATURE: 98.2 F | RESPIRATION RATE: 16 BRPM | OXYGEN SATURATION: 95 % | SYSTOLIC BLOOD PRESSURE: 139 MMHG | HEART RATE: 69 BPM | DIASTOLIC BLOOD PRESSURE: 50 MMHG | BODY MASS INDEX: 27.21 KG/M2 | HEIGHT: 59 IN

## 2021-05-15 VITALS
DIASTOLIC BLOOD PRESSURE: 58 MMHG | HEIGHT: 59 IN | WEIGHT: 102.5 LBS | BODY MASS INDEX: 20.66 KG/M2 | SYSTOLIC BLOOD PRESSURE: 102 MMHG | HEART RATE: 75 BPM

## 2021-05-16 VITALS
BODY MASS INDEX: 26.11 KG/M2 | DIASTOLIC BLOOD PRESSURE: 93 MMHG | OXYGEN SATURATION: 94 % | WEIGHT: 133 LBS | HEART RATE: 62 BPM | SYSTOLIC BLOOD PRESSURE: 144 MMHG | RESPIRATION RATE: 18 BRPM | TEMPERATURE: 97.2 F | HEIGHT: 60 IN

## 2021-05-16 VITALS
SYSTOLIC BLOOD PRESSURE: 149 MMHG | TEMPERATURE: 96.9 F | HEIGHT: 59 IN | OXYGEN SATURATION: 96 % | WEIGHT: 131 LBS | RESPIRATION RATE: 16 BRPM | BODY MASS INDEX: 26.41 KG/M2 | HEART RATE: 57 BPM | DIASTOLIC BLOOD PRESSURE: 64 MMHG

## 2021-05-16 VITALS
DIASTOLIC BLOOD PRESSURE: 77 MMHG | BODY MASS INDEX: 26.61 KG/M2 | OXYGEN SATURATION: 94 % | WEIGHT: 132 LBS | RESPIRATION RATE: 16 BRPM | SYSTOLIC BLOOD PRESSURE: 178 MMHG | TEMPERATURE: 98 F | HEART RATE: 52 BPM | HEIGHT: 59 IN

## 2021-05-16 VITALS
RESPIRATION RATE: 16 BRPM | TEMPERATURE: 96.7 F | OXYGEN SATURATION: 96 % | WEIGHT: 126 LBS | SYSTOLIC BLOOD PRESSURE: 143 MMHG | HEART RATE: 59 BPM | HEIGHT: 59 IN | BODY MASS INDEX: 25.4 KG/M2 | DIASTOLIC BLOOD PRESSURE: 56 MMHG

## 2021-05-16 VITALS
BODY MASS INDEX: 25.74 KG/M2 | WEIGHT: 131.12 LBS | DIASTOLIC BLOOD PRESSURE: 63 MMHG | SYSTOLIC BLOOD PRESSURE: 154 MMHG | HEIGHT: 60 IN

## 2021-05-16 VITALS
DIASTOLIC BLOOD PRESSURE: 88 MMHG | TEMPERATURE: 96.9 F | HEIGHT: 59 IN | WEIGHT: 131 LBS | BODY MASS INDEX: 26.41 KG/M2 | OXYGEN SATURATION: 97 % | RESPIRATION RATE: 18 BRPM | SYSTOLIC BLOOD PRESSURE: 173 MMHG | DIASTOLIC BLOOD PRESSURE: 78 MMHG | SYSTOLIC BLOOD PRESSURE: 140 MMHG | HEART RATE: 59 BPM

## 2021-05-16 VITALS
HEIGHT: 59 IN | RESPIRATION RATE: 16 BRPM | OXYGEN SATURATION: 100 % | HEART RATE: 53 BPM | SYSTOLIC BLOOD PRESSURE: 128 MMHG | DIASTOLIC BLOOD PRESSURE: 86 MMHG | BODY MASS INDEX: 25.4 KG/M2 | WEIGHT: 126 LBS | TEMPERATURE: 96.6 F

## 2021-05-16 VITALS
DIASTOLIC BLOOD PRESSURE: 71 MMHG | WEIGHT: 126.25 LBS | SYSTOLIC BLOOD PRESSURE: 150 MMHG | BODY MASS INDEX: 24.79 KG/M2 | HEIGHT: 60 IN

## 2021-05-16 VITALS — DIASTOLIC BLOOD PRESSURE: 65 MMHG | SYSTOLIC BLOOD PRESSURE: 124 MMHG

## 2021-05-16 VITALS
TEMPERATURE: 97.5 F | HEART RATE: 59 BPM | HEIGHT: 59 IN | RESPIRATION RATE: 16 BRPM | DIASTOLIC BLOOD PRESSURE: 70 MMHG | WEIGHT: 128 LBS | BODY MASS INDEX: 25.8 KG/M2 | OXYGEN SATURATION: 97 % | SYSTOLIC BLOOD PRESSURE: 157 MMHG

## 2021-05-16 VITALS
BODY MASS INDEX: 25.52 KG/M2 | WEIGHT: 130 LBS | HEIGHT: 60 IN | DIASTOLIC BLOOD PRESSURE: 59 MMHG | SYSTOLIC BLOOD PRESSURE: 126 MMHG

## 2021-05-16 VITALS — SYSTOLIC BLOOD PRESSURE: 120 MMHG | DIASTOLIC BLOOD PRESSURE: 78 MMHG

## 2021-05-16 VITALS — SYSTOLIC BLOOD PRESSURE: 150 MMHG | DIASTOLIC BLOOD PRESSURE: 82 MMHG

## 2021-05-28 VITALS
RESPIRATION RATE: 14 BRPM | HEART RATE: 58 BPM | DIASTOLIC BLOOD PRESSURE: 54 MMHG | TEMPERATURE: 97.9 F | HEIGHT: 60 IN | WEIGHT: 120 LBS | OXYGEN SATURATION: 97 % | BODY MASS INDEX: 23.56 KG/M2 | SYSTOLIC BLOOD PRESSURE: 131 MMHG

## 2021-05-28 VITALS
TEMPERATURE: 98.1 F | SYSTOLIC BLOOD PRESSURE: 120 MMHG | BODY MASS INDEX: 26.31 KG/M2 | RESPIRATION RATE: 13 BRPM | BODY MASS INDEX: 20.81 KG/M2 | TEMPERATURE: 98 F | OXYGEN SATURATION: 97 % | WEIGHT: 134 LBS | RESPIRATION RATE: 16 BRPM | HEIGHT: 60 IN | HEART RATE: 59 BPM | SYSTOLIC BLOOD PRESSURE: 118 MMHG | WEIGHT: 106 LBS | HEIGHT: 60 IN | HEART RATE: 62 BPM | DIASTOLIC BLOOD PRESSURE: 52 MMHG | DIASTOLIC BLOOD PRESSURE: 63 MMHG | OXYGEN SATURATION: 98 %

## 2021-05-28 NOTE — PROGRESS NOTES
Patient: CAMERON AVILEZ     Acct: [id number]     Report: #[id number]  UNIT #: [id number]     : 1945    Encounter Date:2020  PRIMARY CARE: IRMA MCWILLIAMS  ***Signed***  --------------------------------------------------------------------------------------------------------------------  Chief Complaint      Encounter Date      2020            Primary Care Provider      IRMA MCWILLIAMS            Referring Provider            [facility]            Patient Complaint      Patient is complaining of      Patient here today for 5 month f/u, Pulmonary Nodule            VITALS      Height 60 in / 152.4 cm      Weight 120 lbs  / 54.907838 kg      BSA 1.50 m2      BMI 23.4 kg/m2      Temperature 97.9 F / 36.61 C - Oral      Pulse 58      Respirations 14      Blood Pressure 131/54 Sitting, Right Arm      Pulse Oximetry 97%, room air            HPI      The patient is a 74 year old  female, patient of Dr. Umanzor who has a     history of a right upper lobe solitary pulmonary nodule, bilateral pleural     effusions with thoracentesis in the past, chronic diastolic heart failure,     chronic kidney disease stage IV and borderline stage 4 being followed by Dr. Abelino Rodas of nephrology, and mediastinal lymphadenopathy who is here for follow up     today.  The patient use to be followed by Dr. Magallon, pulmonologist in [place], [place] per patient's daughter in law report who is also providing history and had a     bronchoscopy with biopsies of lymph node that were negative for malignancy. The     patient also had a CT scan that was performed back in 2019 that had     demonstrated a 1 cm noncalcified nodule in the right upper lobe that was     unchanged and 1.4 cm right suprahilar lymphadenopathy node that was slightly     smaller.  The patient had a repeat CT scan done on 2020 which showed a     stable 11 mm right apical lung nodule and stable lung nodules in the right lung.     This had  previously demonstrated increased FDG activity on PET scan from 2018     and remains concerning for low grade malignancy.  There is also otherwise stable    tiny lung nodules in the right lung. The patient denies any fever, chills, night    sweats, dyspnea, cough, wheezing, hemoptysis, unintentional weight loss, chest     pain, chest tightness, swollen glands in the head and neck, nausea, vomiting or     diarrhea.  The patient is a lifelong nonsmoker. The patient states there is no     family history of lung cancer, but does have family members with a history of     breast cancer.  The patient states that she is also wearing a CPAP at night. The    patient states that she uses a full face mask and is under the care of VERONIKA Toure who is managing her CPAP treatment. The patient states she receives     her supplies through [employer].  The patient denies any headaches or excessive     daytime sleepiness.            I have personally reviewed the review of systems, past family, social, surgical     and medical histories and I agree with those as entered in the chart.      Copies To:   SAMMY RDZ      Constitutional:  Denies: Fatigue, Fever, Weight gain, Weight loss, Chills,     Insomnia, Other      Respiratory/Breathing:  Denies: Shortness of air, Wheezing, Cough, Hemoptysis,     Pleuritic pain, Other      Endocrine:  Denies: Polydipsia, Polyuria, Heat/cold intolerance, Abnorml     menstrual pattern, Diabetes, Other      Eyes:  Denies: Blurred vision, Vision Changes, Other      Ears, nose, mouth, throat:  Denies: Mouth lesions, Thrush, Throat pain,     Hoarseness, Allergies/Hay Fever, Post Nasal Drip, Headaches, Recent Head Injury,    Nose Bleeding, Neck Stiffness, Thyroid Mass, Hearing Loss, Ear Fullness, Dry     Mouth, Nasal or Sinus Pain, Dry Lips, Nasal discharge, Nasal congestion, Other      Cardiovascular:  Denies: Palpitations, Syncope, Claudication, Chest Pain, Wake     up Gasping for  air, Leg Swelling, Irregular Heart Rate, Cyanosis, Dyspnea on     Exertion, Other      Gastrointestinal:  Denies: Nausea, Constipation, Diarrhea, Abdominal pain,     Vomiting, Difficulty Swallowing, Reflux/Heartburn, Dysphagia, Jaundice,     Bloating, Melena, Bloody stools, Other      Genitourinary:  Denies: Urinary frequency, Incontinence, Hematuria, Urgency,     Nocturia, Dysuria, Testicular problems, Other      Musculoskeletal:  Denies: Joint Pain, Joint Stiffness, Joint Swelling, Myalgias,    Other      Hematologic/lymphatic:  DENIES: Lymphadenopathy, Bruising, Bleeding tendencies,     Other      Neurological:  Denies: Headache, Numbness, Weakness, Seizures, Other      Psychiatric:  Denies: Anxiety, Appropriate Effect, Depression, Other      Sleep:  No: Excessive daytime sleep, Morning Headache?, Snoring, Insomnia?, Stop    breathing at sleep?, Other      Integumentary:  Denies: Rash, Dry skin, Skin Warm to Touch, Other      Immunologic/Allergic:  Denies: Latex allergy, Seasonal allergies, Asthma,     Urticaria, Eczema, Other      Immunization status:  No: Up to date            FAMILY/SOCIAL/MEDICAL HX      Surgical History:  Yes: Appendectomy, Bowel Surgery, Cholecystectomy, Head Primo    luis a (EXPLORATORY-AGE 15-LEFT EYE X 2); No: Abdominal Surgery, Bladder Surgery,     CABG, Oral Surgery, Orthopedic Surgery, Vascular Surgery      Stroke - Family Hx:  Sister      Heart - Family Hx:  Brother      Diabetes - Family Hx:  Mother      Cancer/Type - Family Hx:  Mother, Sister, Grandparent      Is Father Still Living?:  No      Is Mother Still Living?:  No      Smoking status:  Never smoker      Medical History:  Yes: Arthritis (HANDS), Congestive Heart Failu, Deafness or     Ringing Ears (BILATERAL HEARING AIDS), High Blood Pressure (CONTROLLED WITH     MED), High Cholesterol, Kidney or Bladder Disease, Reflux Disease, Stroke; No:     Asthma, Blood Disease, Chemotherapy/Cancer, Chronic Bronchitis/COPD, Diabetes,      Seizures, Heart Attack, Hemorrhoids/Rectal Prob, Shortness Of Breath, Sinus     Trouble, Miscellaneous Medical/oth      Psychiatric History      none            PREVENTION      Hx Influenza Vaccination:  Yes      Date Influenza Vaccine Given:  Sep 1, 2019      Influenza Vaccine Declined:  No      2 or More Falls Past Year?:  No      Fall Past Year with Injury?:  No      Hx Pneumococcal Vaccination:  Yes      Encouraged to follow-up with:  PCP regarding preventative exams.      Chart initiated by      Yecenia Haq CMA            ALLERGIES/MEDICATIONS      Allergies:        Coded Allergies:             AMIODARONE (Unverified  Allergy, Unknown, 1/21/20)           METRONIDAZOLE (Unverified  Allergy, Unknown, 1/21/20)           SULFAMETHOXAZOLE (Unverified  Allergy, Unknown, 1/21/20)           TRIMETHOPRIM (Unverified  Allergy, Unknown, 1/21/20)           CODEINE (Verified  Adverse Reaction, Mild, NAUSEA, 1/21/20)           NIACIN (Verified  Adverse Reaction, Unknown, GOT HOT AND TURNED RED,     1/21/20)      Medications    Last Reconciled on 1/21/20 13:51 by LASHAWN EASLEY,       Pregabalin (Lyrica*) 50 Mg Cap      50 MG PO QDAY, #30 CAP         Reported         8/28/19       Rosuvastatin Calcium (Crestor*) 10 Mg Tablet      10 MG PO HS, #30 TAB 0 Refills         Reported         8/28/19       amLODIPine (amLODIPine) 10 Mg Tablet      10 MG PO QDAY, #30 TAB 0 Refills         Reported         8/28/19       Bumetanide (BUMETANIDE) 0.5 Mg Tablet      1 MG PO QDAY, #60 TAB         [facility]         7/26/19       Spironolactone (Spironolactone*) 25 Mg Tablet      25 MG PO QDAY for 30 Days, #30 TAB         [facility]         7/26/19       Sodium Bicarbonate (Sodium Bicarbonate) 650 Mg Tablet      650 MG PO BID for 30 Days, #60 TAB         [facility]         7/26/19       Pantoprazole (Protonix*) 40 Mg Tablet.dr      40 MG PO QDAY@07, #30 TAB 0 Refills         Reported         7/23/19        Omega 3 Polyunsat Fatty Acids (Omega-3 Fish Oil) 1 Each Capsule      1 GM PO QDAY, #30 CAP 0 Refills         Reported         7/23/19       Nitroglycerin (Nitrostat*) 0.4 Mg Tablet               Reported         7/23/19       Carvedilol (Carvedilol) 12.5 Mg Tablet      25 MG PO BID, #120 TAB 0 Refills         Reported         7/23/19       Cetirizine Hcl (CETIRIZINE HCL) 10 Mg Tablet      10 MG PO QDAY, #30 TAB 0 Refills         Reported         11/26/17       traZODone HCl (Desyrel) 50 Mg Tablet      50 MG PO HS, #30 TAB 0 Refills         Reported         11/26/17       Donepezil Hcl (Donepezil*) 5 Mg Tablet      5 MG PO HS, TAB         Reported         11/26/17       Folic Acid/Multivits-Min/Lut (Multi-Vitamin) 1 Tab Tablet      1 TAB PO QDAY, #30 TAB 0 Refills         Reported         7/4/15       Clopidogrel Bisulfate (Plavix) 75 Mg Tablet      75 MG PO QDAY, TAB         Reported         7/4/15       Aspirin Chew (Aspirin Chew) 81 Mg Tab.chew      81 MG PO QDAY, #30 TAB.CHEW 0 Refills         Reported         7/4/15      Current Medications      Current Medications Reviewed 1/21/20            EXAM      VITAL SIGNS:  Reviewed.        GENERAL: Well built, well nourished, in no acute distress.        NECK:  Supple without tracheal deviation or lymphadenopathy.  No thyromegaly     appreciated.      LYMPHATICS:  No cervical or supraclavicular lymphadenopathy.      HEENT: Pupils are equal, round and reactive to light. There is no scleral     icterus.  Nares patent without hypertrophy of the turbinates. No erythema of the    passages.  TMs are clear bilaterally with good cone of light. The posterior     pharynx is without  lesions or erythema.      RESPIRATORY:  Lungs clear to auscultation bilaterally, no wheezes, rales or     rhonchi, normal work of breathing noted, patient able to speak full sentences     without difficulty.       CARDIOVASCULAR:  Regular rate and rhythm.  No murmurs, gallops or rubs.  No      lower extremity edema.  Equal radial pulses.        GI: Soft, nontender, nondistended, no organomegaly.  Bowel sounds present in all    four quadrants.      MUSCULOSKELETAL:  No joint effusions, erythema or warmth over the major joint     systems.      SKIN:  No rashes or lesions.      NEUROLOGIC: Cranial nerves II-XII are intact bilaterally.  Moves all     extremities. Ambulates with ease.      PSYCH:  Appropriate mood and affect.      Vtials      Vitals:             Height 60 in / 152.4 cm           Weight 120 lbs  / 54.335479 kg           BSA 1.50 m2           BMI 23.4 kg/m2           Temperature 97.9 F / 36.61 C - Oral           Pulse 58           Respirations 14           Blood Pressure 131/54 Sitting, Right Arm           Pulse Oximetry 97%, room air            REVIEW      Results Reviewed      PCCS Results Reviewed?:  Yes Prev Lab Results, Yes Prev Radiology Results, Yes     Previous Mecial Records      Radiographic Results               [facility] Diagnostic Img                PACS RADIOLOGY REPORT            Patient: CAMERON AVILEZ   Acct: #[id number]   Report: #[id number]            UNIT #: [id number]    DOS: 20 0915      INSURANCE:MEDICARE PART A   LOCATION:[facility]     : 1945            PROVIDERS      ADMITTING:     ATTENDING: Vanessa Bhakta PA-C      FAMILY:  IRMA MCWILLIAMS [facility]   ORDERING:  Vanessa Bhakta PA-C         OTHER:    DICTATING:  ALYSSA MORALEZ MD            REQ #:[id number]   EXAM:WO - CT CHEST without CONTRAST      REASON FOR EXAM:        REASON FOR VISIT:  SOL PULM NODULE            *******Signed******         PROCEDURE:   CT CHEST WITHOUT CONTRAST             COMPARISON:   [facility], CT, CHEST W/O CONTRAST, 2015,     11:48.  [facility], CT, CHEST W/O CONTRAST, 2019, 11:32.             INDICATIONS:   FOLLOWUP LUNG NODULE, NO COMPLAINTS TODAY             TECHNIQUE:   CT images  were created without the administration of contrast     material.               PROTOCOL:     Standard imaging protocol performed                RADIATION:     DLP: 202.4mGy*cm          Automated exposure control was utilized to minimize radiation dose.              FINDINGS:         There is a stable 12 mm noncalcified nodule in the right lung apex.  Stable     subpleural calcified       granuloma in the left lung apex.  Stable tiny nodular densities in the right     upper lobe.  Stable 2       mm right minor fissure nodule on axial image 43. There is mild subpleural     atelectasis versus       interstitial scarring in the dependent portions of the lung bases.  There is no     pneumothorax or       pleural effusion.  There is a left subclavian stent in the proximal subclavian     artery.  There is a       dense focal calcification at the proximal right subclavian artery.  There is     extensive coronary       artery disease with what appear to be coronary stents in place.  The heart is     mildly enlarged.             Limited images of the upper abdomen demonstrate no acute findings.  The left     kidney is atrophic.        There are extensive upper abdominal vascular calcifications.  There is no acute     osseous abnormality       or destructive bone lesion.  There are moderate lower cervical degenerative     changes               CONCLUSION:         1. Stable 11 mm right apical lung nodule and stable tiny nodules in the right     lung.  This has       previously demonstrated increased FDG activity on a PET scan from 2018 and     remains concerning for       low-grade malignancy.  If not already performed, percutaneous biopsy should be     considered for       definitive diagnosis.      2. There are otherwise stable tiny lung nodules in the right lung.      3. Coronary artery disease and cardiomegaly.      4. Sclerotic/atrophic left kidney.              ALYSSA MORALEZ MD             Electronically Signed and  Approved By: ALYSSA MORALEZ MD on 1/14/2020 at 9:39                        Until signed, this is an unconfirmed preliminary report that may contain      errors and is subject to change.                                              ALTD1:      D:01/14/20 0939            Assessment      Pulmonary nodule - R91.1            Abnormal chest CT - R93.89            Dyspnea - R06.00            Notes      New Diagnostics      * Chest W/O Cont CT, 3 Months         Dx: Abnormal chest CT - R93.89      IMPRESSION:      1.  Right upper lobe solitary pulmonary nodule, stable in size on most recent CT    scan of the chest.      2. Mediastinal lymphadenopathy.      3. Bilateral pleural effusion, history of left thoracentesis.      4. Chronic diastolic heart failure, currently appears grossly euvolemic.      5.  Chronic kidney disease, stage 4, borderline stage 5 being followed by     nephrology.        6.  Lifelong never smoker.               PLAN:      1.  I have discussed chest CT scan results with both patient and patient's     daughter in law in the office today. Chest CT scan does show stable 11 mm right     apical lung nodule and stable tiny nodules in the right lung.  This had     previously demonstrated increased FDG activity on PET scan from 2018 and remains    concerning for low grade malignancy.  I offered to order a CT guided biopsy for     patient to have definite diagnosis, however patient refuses at this time and     would like to wait and be rescanned again in three months. Risks of refusal     discussed with patient. Patient verbalized understanding.  We will repeat chest     CT scan again in three months.        2. For chronic diastolic heart failure, patient is under the care of Dr. Larsen and is advised to follow up with him as scheduled.      3. Chronic kidney stage 4, borderline stage 5, patient is under the care of Dr. Cleaning and is advised to follow up with him as scheduled.      4.  The patient  is up-to-date with her flu vaccine. Patient states that she is     scheduled to get a pneumonia vaccine and will be receiving that through her PCP.           5.  Patient is to follow up with Dr. Umanzor in 3-4 months, sooner if needed.            Patient Education      Patient Education Provided:  Lung Cancer      Time Spent:  > 50% /Coord Care            Electronically signed by LASHAWN EASLEY PCCS  01/23/2020 12:31       Disclaimer: Converted document may not contain table formatting or lab diagrams. Please see [email or web address] for the authenticated document.

## 2021-05-28 NOTE — PROGRESS NOTES
Patient: CAMERON AVILEZ     Acct: [id number]     Report: #[id number]  UNIT #: [id number]     : 1945    Encounter Date:06/15/2020  PRIMARY CARE: IRMA MCWILLIAMS  ***Signed***  --------------------------------------------------------------------------------------------------------------------  Chief Complaint      Encounter Date      Santosh 15, 2020            Primary Care Provider      IRMA MCWILLIASM            Referring Provider            [facility]            Patient Complaint      Patient is complaining of      1 month f/u            VITALS      Height 5 ft  / 152.4 cm      Weight 106 lbs  / 48.637878 kg      BSA 1.43 m2      BMI 20.7 kg/m2      Temperature 98 F / 36.67 C      Pulse 59      Respirations 16      Blood Pressure 120/63 Sitting, Right Arm      Pulse Oximetry 98%, room air            HPI      The patient is a pleasant 75 year old white female patient of Dr. Umanzor's last     seen by her on 2020 for TeleHealth.  At that time, she had a PET scan     ordered and was planned to have transthoracic needle biopsy of 1.2 cm right     upper lobe pulmonary nodule as it had previously had low grade PET avidity.  On     repeat PET scan from 2020, this nodule was no longer hypermetabolic,     findings consistent with a benign nodule and paratracheal and right hilar lymph     node were no longer hypermetabolic either, thus Dr. Umanzor had transthoracic     needle biopsy cancelled.  The patient has denied any new or worsening symptoms.     She specifically denies dyspnea, cough or wheezing.  She denies hemoptysis,     fever or chills, lower extremity edema or weight gain.  She reports feeling     well.              I have reviewed ROS, medical, surgical and family history and agree with those     as entered in the chart.            ROS      Constitutional:  Denies: Fatigue, Fever, Weight gain, Weight loss, Chills,     Insomnia, Other      Respiratory/Breathing:  Denies: Shortness of air,  Wheezing, Cough, Hemoptysis,     Pleuritic pain, Other      Endocrine:  Denies: Polydipsia, Polyuria, Heat/cold intolerance, Abnorml     menstrual pattern, Diabetes, Other      Eyes:  Denies: Blurred vision, Vision Changes, Other      Ears, nose, mouth, throat:  Denies: Mouth lesions, Thrush, Throat pain,     Hoarseness, Allergies/Hay Fever, Post Nasal Drip, Headaches, Recent Head Injury,    Nose Bleeding, Neck Stiffness, Thyroid Mass, Hearing Loss, Ear Fullness, Dry     Mouth, Nasal or Sinus Pain, Dry Lips, Nasal discharge, Nasal congestion, Other      Cardiovascular:  Denies: Palpitations, Syncope, Claudication, Chest Pain, Wake     up Gasping for air, Leg Swelling, Irregular Heart Rate, Cyanosis, Dyspnea on     Exertion, Other      Gastrointestinal:  Denies: Nausea, Constipation, Diarrhea, Abdominal pain,     Vomiting, Difficulty Swallowing, Reflux/Heartburn, Dysphagia, Jaundice,     Bloating, Melena, Bloody stools, Other      Genitourinary:  Denies: Urinary frequency, Incontinence, Hematuria, Urgency,     Nocturia, Dysuria, Testicular problems, Other      Musculoskeletal:  Denies: Joint Pain, Joint Stiffness, Joint Swelling, Myalgias,    Other      Hematologic/lymphatic:  DENIES: Lymphadenopathy, Bruising, Bleeding tendencies,     Other      Neurological:  Denies: Headache, Numbness, Weakness, Seizures, Other      Psychiatric:  Denies: Anxiety, Appropriate Effect, Depression, Other      Sleep:  No: Excessive daytime sleep, Morning Headache?, Snoring, Insomnia?, Stop    breathing at sleep?, Other      Integumentary:  Denies: Rash, Dry skin, Skin Warm to Touch, Other      Immunologic/Allergic:  Denies: Latex allergy, Seasonal allergies, Asthma,     Urticaria, Eczema, Other      Immunization status:  No: Up to date            FAMILY/SOCIAL/MEDICAL HX      Surgical History:  Yes: Appendectomy, Bowel Surgery, Cholecystectomy, Head     Surgery (EXPLORATORY-AGE 15-LEFT EYE X 2); No: Abdominal Surgery, Bladder      Surgery, CABG, Oral Surgery, Orthopedic Surgery, Vascular Surgery      Stroke - Family Hx:  Sister      Heart - Family Hx:  Brother      Diabetes - Family Hx:  Mother      Cancer/Type - Family Hx:  Mother, Sister, Grandparent      Is Father Still Living?:  No      Is Mother Still Living?:  No      Social History:  No Tobacco Use, No Alcohol Use, No Recreational Drug use      Smoking status:  Never smoker      Anticoagulation Therapy:  No      Antibiotic Prophylaxis:  No      Medical History:  Yes: Arthritis (HANDS), Congestive Heart Failu, Deafness or     Ringing Ears (BILATERAL HEARING AIDS), Depression, High Blood Pressure (    CONTROLLED WITH MED), High Cholesterol, Kidney or Bladder Disease, Reflux     Disease, Stroke; No: Asthma, Blood Disease, Chemotherapy/Cancer, Chronic     Bronchitis/COPD, Diabetes, Seizures, Heart Attack, Hemorrhoids/Rectal Prob,     Shortness Of Breath, Sinus Trouble, Miscellaneous Medical/oth      Psychiatric History      depression            PREVENTION      Hx Influenza Vaccination:  Yes      Date Influenza Vaccine Given:  Sep 1, 2019      Influenza Vaccine Declined:  No      2 or More Falls Past Year?:  No      Fall Past Year with Injury?:  No      Hx Pneumococcal Vaccination:  Yes      Encouraged to follow-up with:  PCP regarding preventative exams.      Chart initiated by      Ilana Perez CMA            ALLERGIES/MEDICATIONS      Allergies:        Coded Allergies:             AMIODARONE (Unverified  Allergy, Unknown, 6/15/20)           METRONIDAZOLE (Unverified  Allergy, Unknown, 6/15/20)           SULFAMETHOXAZOLE (Unverified  Allergy, Unknown, 6/15/20)           TRIMETHOPRIM (Unverified  Allergy, Unknown, 6/15/20)           CODEINE (Verified  Adverse Reaction, Mild, NAUSEA, 6/15/20)           NIACIN (Verified  Adverse Reaction, Unknown, GOT HOT AND TURNED RED,     6/15/20)      Medications    Last Reconciled on 6/15/20 09:49 by MARIAH CALHOUN      (shot for osteo) Unknown  Strength  No Conflict Check               Reported         5/12/20       Pregabalin (Lyrica*) 50 Mg Cap      50 MG PO QDAY, #30 CAP         Reported         8/28/19       Rosuvastatin Calcium (Crestor*) 10 Mg Tablet      10 MG PO HS, #30 TAB 0 Refills         Reported         8/28/19       amLODIPine (amLODIPine) 10 Mg Tablet      10 MG PO QDAY, #30 TAB 0 Refills         Reported         8/28/19       Bumetanide (BUMETANIDE) 0.5 Mg Tablet      1 MG PO QDAY, #60 TAB         [facility]         7/26/19       Spironolactone (Spironolactone*) 25 Mg Tablet      25 MG PO QDAY for 30 Days, #30 TAB         [facility]         7/26/19       Sodium Bicarbonate (Sodium Bicarbonate) 650 Mg Tablet      650 MG PO BID for 30 Days, #60 TAB         [facility]         7/26/19       Pantoprazole (Protonix) 40 Mg Tablet.dr      40 MG PO QDAY@07, #30 TAB 0 Refills         Reported         7/23/19       Nitroglycerin (Nitrostat*) 0.4 Mg Tablet               Reported         7/23/19       Carvedilol (Carvedilol) 12.5 Mg Tablet      25 MG PO BID, #120 TAB 0 Refills         Reported         7/23/19       Cetirizine Hcl (CETIRIZINE HCL) 10 Mg Tablet      10 MG PO QDAY, #30 TAB 0 Refills         Reported         11/26/17       traZODone HCl (Desyrel) 50 Mg Tablet      50 MG PO HS, #30 TAB 0 Refills         Reported         11/26/17       Donepezil Hcl (Donepezil*) 5 Mg Tablet      5 MG PO HS, TAB         Reported         11/26/17       Aspirin Chew (Aspirin Chew) 81 Mg Tab.chew      81 MG PO QDAY, #30 TAB.CHEW 0 Refills         Reported         7/4/15      Current Medications      Current Medications Reviewed 6/15/20            EXAM      VITAL SIGNS:  Reviewed.        NECK:  Supple without tracheal deviation or lymphadenopathy.  No thyromegaly     appreciated.      LYMPHATICS:  No cervical or supraclavicular lymphadenopathy.      HEENT: Pupils are equal, round and reactive to light. There is no scleral      icterus.  Nares patent without hypertrophy of the turbinates. No erythema of the    passages.  TMs are clear bilaterally with good cone of light. The posterior     pharynx is without  lesions or erythema.      RESPIRATORY:  Lungs have mildly decreased breath sounds, no wheezes, rhonchi or     crackles appreciated, normal work of breathing.        CARDIOVASCULAR:  Regular rate and rhythm.  No murmurs, gallops or rubs.  No     lower extremity edema.  Equal radial pulses.        GI: Soft, nontender, nondistended, no organomegaly.  Bowel sounds present in all    four quadrants.      MUSCULOSKELETAL:  No joint effusions, erythema or warmth over the major joint     systems.      SKIN:  No rashes or lesions.      NEUROLOGIC: Cranial nerves II-XII are intact bilaterally.  Moves all     extremities. Ambulates with ease.      PSYCH:  Appropriate mood and affect.      Vtials      Vitals:             Height 5 ft  / 152.4 cm           Weight 106 lbs  / 48.551620 kg           BSA 1.43 m2           BMI 20.7 kg/m2           Temperature 98 F / 36.67 C           Pulse 59           Respirations 16           Blood Pressure 120/63 Sitting, Right Arm           Pulse Oximetry 98%, room air            REVIEW      Results Reviewed      P[facility]S Results Reviewed?:  Yes Prev Lab Results, Yes Prev Radiology Results, Yes     Previous [facility] Records      Lab Results      I personally reviewed previous lab work, imaging and provider notes.            Assessment      Pulmonary nodule - R91.1            Notes      New Diagnostics      * Chest W/O Cont CT, 6 Months         Dx: Pulmonary nodule - R91.1      ASSESSMENT:       1.  Right apical pulmonary nodule, now PET negative.        2. Right paratracheal and right hilar lymphadenopathy, now PET negative.        3. Never smoker.      4. Chronic diastolic heart failure with exacerbations requiring hospitalization     in the past.      5. History of bilateral pleural effusion, status post left  pleurocentesis     consistent with heart failure.      6.  Stage 4 chronic kidney disease followed by nephrology.              PLAN:      1. I have discussed with the patient and her daughter in law regarding recent     PET scan and plans and recommendations going forward. I have discussed that     since PET scan shows nodule and lymphadenopathy are no longer PET positive, that    she should not need transthoracic needle biopsy at this time.  I will repeat a     chest CT for her in six months to continue following nodule and lymphadenopathy.      2.  Continue following with Dr. Larsen for chronic diastolic heart failure.      3.  Continue following with Dr. Cleaning for chronic kidney disease stage 4.      4. Up-to-date on flu and pneumonia vaccinations.      5. Follow up in six months with Dr. Umanzor, sooner if needed.            Patient Education      Time Spent:  > 50% /[facility]            Electronically signed by TORY BOOKER PA-C  06/29/2020 15:25       Disclaimer: Converted document may not contain table formatting or lab diagrams. Please see [employer] for the authenticated document.

## 2021-05-28 NOTE — PROGRESS NOTES
Patient: CAMERON AVILEZ     Acct: [id number]     Report: #[id number]  UNIT #: [id number]     : 1945    Encounter Date:2019  PRIMARY CARE: IRMA MCWILLIAMS  ***Signed***  --------------------------------------------------------------------------------------------------------------------  Chief Complaint      Encounter Date      Aug 28, 2019            Primary Care Provider      IRMA MCWILLIAMS            Referring Provider            [facility]            Patient Complaint      Patient is complaining of      Patient here today for [facility] follow up            VITALS      Height 5 ft 0 in / 152.4 cm      Weight 134 lbs  / 60.908655 kg      BSA 1.57 m2      BMI 26.2 kg/m2      Temperature 98.1 F / 36.72 C - Oral      Pulse 62      Respirations 13      Blood Pressure 118/52 Sitting, Right Arm      Pulse Oximetry 97%, room air            HPI      The patient is a very pleasant 74 year old white female recently seen by Dr. Umanzor when hospitalized at [facility] on 19. At that time     she had worsening dyspnea, lower extremity edema and orthopnea. She has a     history of chronic kidney disease stage IV to early V with contrast induced     nephropathy after having PCI and coronary stenting a few months prior. She also     has a history of diastolic heart failure as well as obstructive sleep apnea on     nightly CPAP. The patient also has history of a pulmonary nodule that appears to    have initially seen on CT scan of the chest done in 2018. The nodule was    1 mm in the right lung apex, it was mildly PET positive. She apparently been     followed up for this by Dr. Magallon pulmonologist in [place] and according to t    he patient's daughter in law, she had bronchoscopy with biopsy of lymph nodes     that were negative for malignancy. We do not have these records and it appears     these were attempted to obtained while the patient was in the hospital and they     were never  obtained. The patient was planned to have continued surveillance of     right upper lobe nodule. On most recent CT scan of the chest done during her     hospitalization on 07/23/19, this had not changed in size from 6 months prior     and suprahilar lymph nodes were slightly smaller. The patient states she has     done much better since her hospital discharge. She was diuresed and planned to     follow up with nephrology and may need dialysis. There was some question if she     was going to have hemodialysis or peritoneal dialysis.  The patient is not sure     what the current plans are. She denies any weight gain, lower extremity edema,     orthopnea, increased dyspnea, coughing or wheezing since her discharge. She     overall feels like she is doing well.             I reviewed her Review of Systems, medical, surgical and family history and agree    with those as entered.      Copies To:   SAMMY RDZ ;            SARA      Constitutional:  Complains of: Fatigue; Denies: Fever, Weight gain, Weight loss,    Chills, Insomnia, Other      Respiratory/Breathing:  Complains of: Shortness of air; Denies: Wheezing, Cough,    Hemoptysis, Pleuritic pain, Other      Endocrine:  Denies: Polydipsia, Polyuria, Heat/cold intolerance, Abnorml     menstrual pattern, Diabetes, Other      Eyes:  Denies: Blurred vision, Vision Changes, Other      Ears, nose, mouth, throat:  Complains of: Nasal discharge; Denies: Mouth     lesions, Thrush, Throat pain, Hoarseness, Allergies/Hay Fever, Post Nasal Drip,     Headaches, Recent Head Injury, Nose Bleeding, Neck Stiffness, Thyroid Mass,     Hearing Loss, Ear Fullness, Dry Mouth, Nasal or Sinus Pain, Dry Lips, Nasal     congestion, Other      Cardiovascular:  Denies: Palpitations, Syncope, Claudication, Chest Pain, Wake     up Gasping for air, Leg Swelling, Irregular Heart Rate, Cyanosis, Dyspnea on     Exertion, Other      Gastrointestinal:  Denies: Nausea, Constipation, Diarrhea, Abdominal  pain,     Vomiting, Difficulty Swallowing, Reflux/Heartburn, Dysphagia, Jaundice,     Bloating, Melena, Bloody stools, Other      Genitourinary:  Denies: Urinary frequency, Incontinence, Hematuria, Urgency,     Nocturia, Dysuria, Testicular problems, Other      Musculoskeletal:  Denies: Joint Pain, Joint Stiffness, Joint Swelling, Myalgias,    Other      Hematologic/lymphatic:  DENIES: Lymphadenopathy, Bruising, Bleeding tendencies,     Other      Neurological:  Denies: Headache, Numbness, Weakness, Seizures, Other      Psychiatric:  Denies: Anxiety, Appropriate Effect, Depression, Other      Sleep:  No: Excessive daytime sleep, Morning Headache?, Snoring, Insomnia?, Stop    breathing at sleep?, Other      Integumentary:  Denies: Rash, Dry skin, Skin Warm to Touch, Other      Immunologic/Allergic:  Denies: Latex allergy, Seasonal allergies, Asthma,     Urticaria, Eczema, Other      Immunization status:  No: Up to date            FAMILY/SOCIAL/MEDICAL HX      Surgical History:  Yes: Appendectomy, Head Surgery (EXPLORATORY-AGE 15-LEFT EYE     X 2); No: Abdominal Surgery, Bladder Surgery, Bowel Surgery, CABG,     Cholecystectomy, Oral Surgery, Orthopedic Surgery, Vascular Surgery      Stroke - Family Hx:  Sister      Heart - Family Hx:  Brother      Diabetes - Family Hx:  Mother      Cancer/Type - Family Hx:  Mother, Sister, Grandparent      Is Father Still Living?:  No      Is Mother Still Living?:  No      Smoking status:  Never smoker      Medical History:  Yes: Arthritis (HANDS), Congestive Heart Failu, Deafness or     Ringing Ears (BILATERAL HEARING AIDS), High Blood Pressure (CONTROLLED WITH     MED), High Cholesterol, Kidney or Bladder Disease, Reflux Disease, Stroke; No:     Asthma, Blood Disease, Chemotherapy/Cancer, Chronic Bronchitis/COPD, Diabetes,     Seizures, Heart Attack, Hemorrhoids/Rectal Prob, Shortness Of Breath, Miscel    laneous Medical/oth      Psychiatric History      none            PREVENTION       Hx Influenza Vaccination:  Yes      Date Influenza Vaccine Given:  Sep 1, 2018      Influenza Vaccine Declined:  No      2 or More Falls Past Year?:  No      Fall Past Year with Injury?:  No      Hx Pneumococcal Vaccination:  Yes      Encouraged to follow-up with:  PCP regarding preventative exams.      Chart initiated by      Yecenia Haq CMA            ALLERGIES/MEDICATIONS      Allergies:        Coded Allergies:             CODEINE (Verified  Adverse Reaction, Mild, NAUSEA, 8/28/19)           NIACIN (Verified  Adverse Reaction, Unknown, GOT HOT AND TURNED RED,     8/28/19)      Medications    Last Reconciled on 8/28/19 10:56 by MARIAH CALHOUN      Pregabalin (Lyrica*) 50 Mg Cap      50 MG PO QDAY, #30 CAP         Reported         8/28/19       Rosuvastatin Calcium (Crestor*) 10 Mg Tablet      10 MG PO HS, #30 TAB 0 Refills         Reported         8/28/19       Potassium Chloride (K-Dur*) 20 Meq Tab.er.prt      20 MEQ PO QDAY, #30 TAB 0 Refills         Reported         8/28/19       amLODIPine (amLODIPine) 10 Mg Tablet      10 MG PO QDAY, #30 TAB 0 Refills         Reported         8/28/19       Bumetanide (BUMETANIDE) 0.5 Mg Tablet      1 MG PO QDAY, #60 TAB         [facility]         7/26/19       Spironolactone (Spironolactone*) 25 Mg Tablet      25 MG PO QDAY for 30 Days, #30 TAB         [facility]         7/26/19       Sodium Bicarbonate (Sodium Bicarbonate) 650 Mg Tablet      650 MG PO BID for 30 Days, #60 TAB         Prov[facility]         7/26/19       Pantoprazole (Protonix*) 40 Mg Tablet.dr      40 MG PO QDAY@07, #30 TAB 0 Refills         Reported         7/23/19       Omega 3 Polyunsat Fatty Acids (Omega-3 Fish Oil) 1 Each Capsule      1 GM PO QDAY, #30 CAP 0 Refills         Reported         7/23/19       Nitroglycerin (Nitrostat*) 0.4 Mg Tablet               Reported         7/23/19       Carvedilol (Carvedilol) 12.5 Mg Tablet      25 MG PO BID, #120 TAB 0  Refills         Reported         7/23/19       Cetirizine Hcl (CETIRIZINE HCL) 10 Mg Tablet      10 MG PO QDAY, #30 TAB 0 Refills         Reported         11/26/17       Alendronate Sodium (Alendronate) 70 Mg Tablet      70 MG PO MO, #4 TAB         Reported         11/26/17       traZODone HCl (Desyrel) 50 Mg Tablet      50 MG PO HS, #30 TAB 0 Refills         Reported         11/26/17       Donepezil Hcl (Donepezil*) 5 Mg Tablet      5 MG PO HS, TAB         Reported         11/26/17       Folic Acid/Multivits-Min/Lut (Multi-Vitamin) 1 Tab Tablet      1 TAB PO QDAY, #30 TAB 0 Refills         Reported         7/4/15       Clopidogrel Bisulfate (Plavix) 75 Mg Tablet      75 MG PO QDAY, TAB         Reported         7/4/15       Aspirin Chew (Aspirin Chew) 81 Mg Tab.chew      81 MG PO QDAY, #30 TAB.CHEW 0 Refills         Reported         7/4/15      Current Medications      Current Medications Reviewed 8/28/19            EXAM      VITAL SIGNS:  Reviewed.        NECK:  Supple without tracheal deviation or lymphadenopathy.  No thyromegaly     appreciated.      LYMPHATICS:  No cervical or supraclavicular lymphadenopathy.      HEENT: Pupils are equal, round and reactive to light. There is no scleral icte    richar.  Nares patent without hypertrophy of the turbinates. No erythema of the     passages.  TMs are clear bilaterally with good cone of light. The posterior     pharynx is without  lesions or erythema.      RESPIRATORY:  Mildly decreased breath sounds throughout, no wheezes, rhonchi or     crackles, normal work of breathing noted.        CARDIOVASCULAR:  Regular rate and rhythm.  No murmurs, gallops or rubs.  No     lower extremity edema.  Equal radial pulses.        GI: Soft, nontender, nondistended, no organomegaly.  Bowel sounds present in all    four quadrants.      MUSCULOSKELETAL:  No joint effusions, erythema or warmth over the major joint     systems.      SKIN:  No rashes or lesions.      NEUROLOGIC: Cranial  nerves II-XII are intact bilaterally.  Moves all     extremities. Ambulates with ease.      PSYCH:  Appropriate mood and affect.      Vtials      Vitals:             Height 5 ft 0 in / 152.4 cm           Weight 134 lbs  / 60.795364 kg           BSA 1.57 m2           BMI 26.2 kg/m2           Temperature 98.1 F / 36.72 C - Oral           Pulse 62           Respirations 13           Blood Pressure 118/52 Sitting, Right Arm           Pulse Oximetry 97%, room air            REVIEW      Results Reviewed      PCCS Results Reviewed?:  Yes Prev Lab Results, Yes Prev Radiology Results, Yes     Previous [facility] Records (I personally reviewed the patient's most recent     pulmonary consultation, progress notes and discharge summary.)      Radiographic Results               [facility]                PACS RADIOLOGY REPORT            Patient: CAMERON AVILEZ   Acct: #[id number]   Report: #[id number]            UNIT #: [id number]    DOS: 19 1009      INSURANCE:MEDICARE PART A   LOCATION:[place] [address]   : 1945            PROVIDERS      ADMITTING:  ARTI PALMER   ATTENDING: ARTI PALMER      FAMILY:  IRMA MCWILLIAMS [facility]   ORDERING:  SAMMY RDZ         OTHER:    DICTATING:  Mark Frank MD            REQ #:[id number]   EXAM:[facility] - CT CHEST without CONTRAST      REASON FOR EXAM:  right upper lobe nodule, pleural effusions      REASON FOR VISIT:  SOA, QUESTIONABLE CHF            *******Signed******         PROCEDURE:   CT CHEST WITHOUT CONTRAST             COMPARISON:   [facility], PET, SKULL BASE TO MID THIGH INITIAL,     10/17/2018, 11:37.        [facility], CR, ABDOMEN FLAT   [facility], CT, ABD PEL W/O CONTRAST, 2019, 8:23.  [facility], CR, CHEST       AP/PA 1 VIEW, 2019, 17:55.  [facility], CT, CHEST W/O    CONTRAST,       2019, 12:19.              INDICATIONS:   GENERALIZED CHEST PAIN X SEVERAL DAYS. SHORTNESS OF BREATH             TECHNIQUE:   CT images were created without the administration of contrast     material.               PROTOCOL:     Standard imaging protocol performed                RADIATION:     DLP: 570 mGy*cm          Automated exposure control was utilized to minimize radiation dose.              FINDINGS:         Moderate left and small right pleural effusions are larger in comparison to     7/16/2019.             Lung window images reveal 10 mm noncalcified nodule in the right upper lobe     suspicious for       malignancy, stable.  Compressive atelectasis in the posterior lower lobes, left     greater than right.             Mediastinal windows reveal 0.9 cm right paratracheal lymph node, stable.  1.4 cm    right suprahilar       lymph node may be smaller, previously 1.7 cm.              Extensive coronary artery calcifications are evident.             The left kidney has a markedly atrophic appearance.             CONTINUED ON NEXT PAGE...             CONCLUSION:         CT scan of the chest without IV contrast demonstrating moderate left and small     right pleural       effusions, larger in comparison to 7/16/2019.             1 cm noncalcified nodule in the right upper lobe is unchanged.             1.4 cm right suprahilar lymph node may be slightly smaller.              AISHA JAIME MD             Electronically Signed and Approved By: AISHA JAIME MD on 7/23/2019 at 12:59                           Until signed, this is an unconfirmed preliminary report that may contain      errors and is subject to change.                                              COUST:      D:07/23/19 1259            Assessment      Pulmonary nodule - R91.1            Notes      New Medications      * Potassium Chloride (K-Dur*) 20 MEQ TAB.ER.PRT: 20 MEQ PO QDAY #30      * Rosuvastatin Calcium (Crestor*) 10 MG TABLET: 10 MG PO HS #30      *  Pregabalin (Lyrica*) 50 MG CAP: 50 MG PO QDAY #30      Changed Medications      * amLODIPine 10 MG TABLET: 10 MG PO QDAY #30         Replaced 5 MG TABLET: 5 MG PO QDAY #30      New Diagnostics      * Chest W/O Cont CT, 4 Months         Dx: Pulmonary nodule - R91.1      ASSESSMENT:      1. Right upper lobe solitary pulmonary nodule, stable in size on most recent CT     scan of the chest.        2.  Mediastinal lymphadenopathy somewhat improved on recent CT scan of the     chest.       3. Bilateral pleural effusion status post left thoracentesis.      4. Chronic diastolic heart failure currently appears grossly euvolemic.       5. Chronic kidney disease stage IV borderline stage V being followed by     nephrology.       6. Dyspnea improved.             PLAN:       1. I have discussed with the patient regarding her recent hospitalization and     results. She currently appears euvolemic and is following up with nephrology to     see if she will need dialysis and is currently on diuretics. She is to let us     know if she has any increased dyspnea or orthopnea and if she does repeat chest     x-ray can be done to see if she needs additional thoracentesis. I reviewed the     results from her thoracentesis with her and pathology was negative for malignant    cells in pleural fluid sample and microbiology was negative for bacterial     growth.       2. I discussed the patient's pulmonary nodule. I will repeat a CT scan of the     chest in 4-5 months making this a 6 month follow up since her prior CT scan done    in July to continue following this 1 cm right upper lobe nodule.       3. I made a request of records from Dr. Magallon's office pulmonologist in     [place] she had been seeing to get office notes and biopsy results. She     believes she had a bronchoscopy with biopsy of lymph nodes that was negative for    malignancy. She is not sure if she had the nodule biopsied but it has not     changed in size over the past 6  months.       4. Continue to follow up with nephrology as scheduled.       5. She is up to date on pneumonia vaccines and will need flu vaccine when it is     available in fall 2019.       6.  Follow up with Dr. Umanzor in 2-3 months to monitor her volume status, sooner    if needed.            Patient Education      Patient Education Provided:  How to use an Inhaler      Time Spent:  > 50% /[facility]                 Disclaimer: Converted document may not contain table formatting or lab diagrams. Please see [employer] for the authenticated document.

## 2021-05-28 NOTE — PROGRESS NOTES
Patient: CAMERON WEISS     Acct: [id number]     Report: #[id number]  UNIT #: [id number]     : 1945    Encounter Date:2020  PRIMARY CARE: [facility]  ***Signed***  --------------------------------------------------------------------------------------------------------------------  TELEHEALTH NOTE      History of Present Illness            Chief Complaint: 3-4 month follow up, chest ct results            Cameron Weiss is presenting for evaluation via Telehealth visit. Verbal     consent obtained before beginning visit.            Provider spent 23 minutes with the patient during telehealth visit.            The following staff were present during the visit: Evonne Mahajan Ma, Ilana Umanzor DO            The patient is very pleasant 75 year old female who was last seen by VERONIKA Meyer in 2020. She was followed with me as a second opinion, she use    to see Dr. Magallon in [place], [place] for a lung nodule measuring 1.2 cm in the     right upper lobe which did have low grade PET avidity on previous PET scan in     2018.  The patient had repeat imaging which showed stable nodule, but persistent    and concerning for slow growing bronchogenic carcinoma. The patient deferred any    transthoracic needle biopsy at her last visit, but comes back today stating that    she is willing to have further workup at this time.  She is not having any     fever, chills, nausea or vomiting. She has no complaints whatsoever today. She     does take a baby aspirin, is on no other blood thinners. She has not been rece    ntly hospitalized and in fact feels like she is in good health.            I reviewed Inocente Meyer last note. reviewed chest imaging from     2020.                       [facility]                PACS RADIOLOGY REPORT            Patient: CMAERON WEISS   Acct: #[id number]   Report: #[id number]             UNIT #: [id number]    DOS: 20 0900      INSURANCE:MEDICARE PART A   LOCATION:[facility]     : 1945            PROVIDERS      ADMITTING:     ATTENDING: LASHAWN EASLEY      FAMILY:  NONE,MD   ORDERING:  LASHAWN EASLEY         OTHER: CORNELL JESUS   DICTATING:  JHONY BOATENG MD            REQ #:[id number]   EXAM:[facility] - CT CHEST without CONTRAST      REASON FOR EXAM:        REASON FOR VISIT:  SOL PULM NODULE            *******Signed******         PROCEDURE:   CT CHEST WITHOUT CONTRAST             COMPARISON:   [facility], CT, CHEST W/O CONTRAST,     2020, 8:59.             INDICATIONS:   FOLLOW UP LUNG NODULE. NO CHEST COMPLAINTS.             TECHNIQUE:   CT images were created without the administration of contrast     material.               PROTOCOL:     Standard imaging protocol performed                RADIATION:     DLP: 187.6mGy*cm          Automated exposure control was utilized to minimize radiation dose.              FINDINGS:                There is a stable well-circumscribed 12 mm nodule in the anterior right lung     apex.  This appears       unchanged as compared to the prior study.  Is there is mild scarring in the lung    apices.  The there       is a stable 2 mm nodule in the right lung adjacent to the minor fissure seen     best on image number       41 of today's exam.  There is a stable 4 mm nodule in the right lower lobe seen     best on image       number 48 of today's exam.  Scattered calcified granulomatous changes are     present.  There is minor       linear opacity in the lung bases posteriorly bilaterally suggesting scarring or     atelectasis.  Left       subclavian arterial stent again noted.  Extensive calcified atherosclerotic     changes present       including the coronary arteries.  There are probable coronary stents present     given the density.  No       mediastinal adenopathy is seen based on CT size criteria.   Evaluation is     somewhat limited by       noncontrast technique.  No axillary or supraclavicular adenopathy is seen.               In the upper abdomen, limited unenhanced evaluation of the spleen, pancreas,     adrenals, liver,       gallbladder and upper portion of the right kidney is unremarkable.  The left     kidney is atrophic       with a 1 cm hypodense lesion which may be due to a cyst, only partially imaged.     There is extensive       atherosclerotic vascular calcification noted.  There is degenerative change in     the spine.             CONCLUSION:                1. Stable 12 mm nodule in the anterior right lung apex as compared to prior     study of 01/14/2020.        As reported previously, the lesion has demonstrated increased uptake on PET     scan.  The lesion is of       sufficient size to allow percutaneous biopsy for definitive diagnosis.               2. Stable additional subcentimeter pulmonary nodules in the right lung, as     described above.               3. Additional findings as given above.                JHONY BOATENG MD             Electronically Signed and Approved By: JHONY BOATENG MD on 4/21/2020 at 9:55                        Until signed, this is an unconfirmed preliminary report that may contain      errors and is subject to change.                                              WHIK1:      D:04/21/20 0956                         Past Med History      Past Med History: Pleural Effusions            Never Smoker             Flu and Pneumonia Vaccines: Current      Overview of Symptoms      Pt complains of: Pt stated she has no complaints            Pt denies: fever, chills, nausea, vomiting            Most Recent Lab Findings      Laboratory Tests      5/4/20 12:34            Allergies/Medications      Allergies:        Coded Allergies:             AMIODARONE (Unverified  Allergy, Unknown, 1/21/20)           METRONIDAZOLE (Unverified  Allergy, Unknown, 1/21/20)            SULFAMETHOXAZOLE (Unverified  Allergy, Unknown, 1/21/20)           TRIMETHOPRIM (Unverified  Allergy, Unknown, 1/21/20)           CODEINE (Verified  Adverse Reaction, Mild, NAUSEA, 1/21/20)           NIACIN (Verified  Adverse Reaction, Unknown, GOT HOT AND TURNED RED,     1/21/20)      Medications    Last Reconciled on 5/12/20 16:28 by SAMMY RDZ DO      (shot for osteo) Unknown Strength  No Conflict Check               Reported         5/12/20       Pregabalin (Lyrica*) 50 Mg Cap      50 MG PO QDAY, #30 CAP         Reported         8/28/19       Rosuvastatin Calcium (Crestor*) 10 Mg Tablet      10 MG PO HS, #30 TAB 0 Refills         Reported         8/28/19       amLODIPine (amLODIPine) 10 Mg Tablet      10 MG PO QDAY, #30 TAB 0 Refills         Reported         8/28/19       Bumetanide (BUMETANIDE) 0.5 Mg Tablet      1 MG PO QDAY, #60 TAB         [facility]         7/26/19       Spironolactone (Spironolactone*) 25 Mg Tablet      25 MG PO QDAY for 30 Days, #30 TAB         [facility]         7/26/19       Sodium Bicarbonate (Sodium Bicarbonate) 650 Mg Tablet      650 MG PO BID for 30 Days, #60 TAB         Prov: DaryAUSTEN,[facility]         7/26/19       Pantoprazole (Protonix) 40 Mg Tablet.dr      40 MG PO QDAY@07, #30 TAB 0 Refills         Reported         7/23/19       Nitroglycerin (Nitrostat*) 0.4 Mg Tablet               Reported         7/23/19       Carvedilol (Carvedilol) 12.5 Mg Tablet      25 MG PO BID, #120 TAB 0 Refills         Reported         7/23/19       Cetirizine Hcl (CETIRIZINE HCL) 10 Mg Tablet      10 MG PO QDAY, #30 TAB 0 Refills         Reported         11/26/17       traZODone HCl (Desyrel) 50 Mg Tablet      50 MG PO HS, #30 TAB 0 Refills         Reported         11/26/17       Donepezil Hcl (Donepezil*) 5 Mg Tablet      5 MG PO HS, TAB         Reported         11/26/17       Aspirin Chew (Aspirin Chew) 81 Mg Tab.chew      81 MG PO QDAY, #30 TAB.CHEW 0 Refills          Reported         7/4/15            Plan/Instructions      Ambulatory Assessment/Plan:        Right upper lobe pulmonary nodule - R91.1            Notes      New Medications      * (shot for osteo) Unknown Strength:          Instructions: Every 6 months      New Diagnostics      * CBC, Routine         Dx: Right upper lobe pulmonary nodule - R91.1      * PT / INR, Routine         Dx: Right upper lobe pulmonary nodule - R91.1      * BIOPSY CT, SCHEDULED PROCEDURE         Dx: Right upper lobe pulmonary nodule - R91.1      * Tissue Culture Specimen, Routine         Dx: Right upper lobe pulmonary nodule - R91.1      * Fungal Culture W/Sm, Routine         Dx: Right upper lobe pulmonary nodule - R91.1      * AFB Culture      Dx: Right upper lobe pulmonary nodule - R91.1      * Quantiferon Tb Test, Routine         Dx: Right upper lobe pulmonary nodule - R91.1      * PET SKULLBASE-MIDTHIGH SBQ fdg, SCHEDULED PROCEDURE         Dx: Right upper lobe pulmonary nodule - R91.1      * Anticytoplas. Neutro ANCA, Routine         Dx: Right upper lobe pulmonary nodule - R91.1      * Histoplasma Antigen Urine, Routine         Dx: Right upper lobe pulmonary nodule - R91.1      New Office Procedures      * Follow Up Appointment, Month      Plan/Instructions            * Plan Of Care: ()            * Chronic conditions reviewed and taken into consideration for today's treatment       plan.      * Patient instructed to seek medical attention urgently for new or worsening       symptoms.      * Patient was educated/instructed on their diagnosis, treatment and medications       prior to discharge from the clinic today.            ASSESSMENT:       1.  1.2 cm right upper lung nodule, low grade PET avidity.      2. Lifetime nonsmoker.        3. History of chronic diastolic heart failure with exacerbation requiring     hospitalization in the past.      4.  Bilateral pleural effusions with a history of left thoracentesis consistent     with heart  failure.      5. CKD stage 4 followed by nephrology.            PLAN:      1.  I have discussed with the patient that I do recommend that she have     transthoracic needle biopsy of the right upper lobe nodule as it did show low     grade PET avidity in the past.  I have ordered an updated PET scan. I will refer     her to interventional radiologist for a transthoracic needle biopsy and while     they are there they should send tissue for culture, gram stain, fungal and AFB.      2. I will also order blood tests including CBC and INR to be done the day of the     procedure.  She will hold her aspirin seven days prior.      3. Check urinary histo antigen.        4. Check ANCA and quantiferon test.      5. Follow up after CT guided biopsy.      Codes:  Phone Eval 21-30 mi [id number]            Electronically signed by SAMMY RDZ  05/19/2020 08:45       Disclaimer: Converted document may not contain table formatting or lab diagrams. Please see [employer] for the authenticated document.

## 2021-08-12 RX ORDER — PREGABALIN 50 MG/1
CAPSULE ORAL
Qty: 90 CAPSULE | OUTPATIENT
Start: 2021-08-12

## 2021-08-12 RX ORDER — TRAZODONE HYDROCHLORIDE 50 MG/1
TABLET ORAL
Qty: 90 TABLET | OUTPATIENT
Start: 2021-08-12

## 2021-10-18 RX ORDER — CARVEDILOL 12.5 MG/1
TABLET ORAL
Qty: 180 TABLET | Refills: 1 | Status: SHIPPED | OUTPATIENT
Start: 2021-10-18

## 2022-04-08 RX ORDER — CARVEDILOL 12.5 MG/1
TABLET ORAL
Qty: 180 TABLET | Refills: 1 | OUTPATIENT
Start: 2022-04-08